# Patient Record
Sex: FEMALE | Race: WHITE | Employment: UNEMPLOYED | URBAN - NONMETROPOLITAN AREA
[De-identification: names, ages, dates, MRNs, and addresses within clinical notes are randomized per-mention and may not be internally consistent; named-entity substitution may affect disease eponyms.]

---

## 2022-06-10 ENCOUNTER — HOSPITAL ENCOUNTER (INPATIENT)
Age: 67
LOS: 3 days | Discharge: HOME HEALTH | DRG: 603 | End: 2022-06-13
Attending: EMERGENCY MEDICINE | Admitting: INTERNAL MEDICINE
Payer: OTHER GOVERNMENT

## 2022-06-10 DIAGNOSIS — R79.1 SUBTHERAPEUTIC INTERNATIONAL NORMALIZED RATIO (INR): ICD-10-CM

## 2022-06-10 DIAGNOSIS — N18.32 CHRONIC RENAL IMPAIRMENT, STAGE 3B (HCC): ICD-10-CM

## 2022-06-10 DIAGNOSIS — E11.69 TYPE 2 DIABETES MELLITUS WITH HYPERLIPIDEMIA (HCC): ICD-10-CM

## 2022-06-10 DIAGNOSIS — I89.0 LYMPHEDEMA OF BOTH LOWER EXTREMITIES: ICD-10-CM

## 2022-06-10 DIAGNOSIS — L03.115 BILATERAL LOWER LEG CELLULITIS: Primary | ICD-10-CM

## 2022-06-10 DIAGNOSIS — E66.01 MORBID OBESITY (HCC): ICD-10-CM

## 2022-06-10 DIAGNOSIS — L03.116 BILATERAL LOWER LEG CELLULITIS: Primary | ICD-10-CM

## 2022-06-10 DIAGNOSIS — L03.115 CELLULITIS OF RIGHT LEG: ICD-10-CM

## 2022-06-10 DIAGNOSIS — Z86.711 HISTORY OF PULMONARY EMBOLUS (PE): ICD-10-CM

## 2022-06-10 DIAGNOSIS — E78.5 TYPE 2 DIABETES MELLITUS WITH HYPERLIPIDEMIA (HCC): ICD-10-CM

## 2022-06-10 PROBLEM — I82.409 DVT (DEEP VENOUS THROMBOSIS) (HCC): Status: ACTIVE | Noted: 2021-06-21

## 2022-06-10 PROBLEM — E11.22 HYPERTENSION ASSOCIATED WITH STAGE 3B CHRONIC KIDNEY DISEASE DUE TO TYPE 2 DIABETES MELLITUS (HCC): Status: ACTIVE | Noted: 2019-03-12

## 2022-06-10 PROBLEM — I25.10 CORONARY ARTERY DISEASE INVOLVING NATIVE CORONARY ARTERY OF NATIVE HEART WITHOUT ANGINA PECTORIS: Status: ACTIVE | Noted: 2022-01-07

## 2022-06-10 PROBLEM — I26.99 PULMONARY EMBOLISM (HCC): Status: ACTIVE | Noted: 2021-07-08

## 2022-06-10 PROBLEM — I12.9 HYPERTENSION ASSOCIATED WITH STAGE 3B CHRONIC KIDNEY DISEASE DUE TO TYPE 2 DIABETES MELLITUS (HCC): Status: ACTIVE | Noted: 2019-03-12

## 2022-06-10 PROBLEM — R06.09 DYSPNEA ON EXERTION: Status: ACTIVE | Noted: 2021-12-27

## 2022-06-10 PROBLEM — C43.61 MALIGNANT MELANOMA OF RIGHT SHOULDER (HCC): Status: ACTIVE | Noted: 2018-09-18

## 2022-06-10 PROBLEM — K21.9 GASTRO-ESOPHAGEAL REFLUX DISEASE WITHOUT ESOPHAGITIS: Status: ACTIVE | Noted: 2018-05-10

## 2022-06-10 PROBLEM — J96.10 CHRONIC RESPIRATORY FAILURE (HCC): Status: ACTIVE | Noted: 2022-01-11

## 2022-06-10 LAB
ALBUMIN SERPL-MCNC: 3.5 G/DL (ref 3.5–5.2)
ALP BLD-CCNC: 114 U/L (ref 35–104)
ALT SERPL-CCNC: 14 U/L (ref 5–33)
ANION GAP SERPL CALCULATED.3IONS-SCNC: 10 MMOL/L (ref 7–19)
APTT: 44.6 SEC (ref 26–36.2)
APTT: 55.7 SEC (ref 26–36.2)
AST SERPL-CCNC: 21 U/L (ref 5–32)
BASOPHILS ABSOLUTE: 0 K/UL (ref 0–0.2)
BASOPHILS RELATIVE PERCENT: 0.4 % (ref 0–1)
BILIRUB SERPL-MCNC: <0.2 MG/DL (ref 0.2–1.2)
BUN BLDV-MCNC: 29 MG/DL (ref 8–23)
CALCIUM SERPL-MCNC: 8.9 MG/DL (ref 8.8–10.2)
CHLORIDE BLD-SCNC: 102 MMOL/L (ref 98–111)
CO2: 27 MMOL/L (ref 22–29)
CREAT SERPL-MCNC: 1.5 MG/DL (ref 0.5–0.9)
EKG P AXIS: 84 DEGREES
EKG P-R INTERVAL: 172 MS
EKG Q-T INTERVAL: 408 MS
EKG QRS DURATION: 86 MS
EKG QTC CALCULATION (BAZETT): 413 MS
EKG T AXIS: 67 DEGREES
EOSINOPHILS ABSOLUTE: 0.3 K/UL (ref 0–0.6)
EOSINOPHILS RELATIVE PERCENT: 2.4 % (ref 0–5)
GFR AFRICAN AMERICAN: 42
GFR NON-AFRICAN AMERICAN: 35
GLUCOSE BLD-MCNC: 141 MG/DL (ref 70–99)
GLUCOSE BLD-MCNC: 143 MG/DL (ref 70–99)
GLUCOSE BLD-MCNC: 150 MG/DL (ref 70–99)
GLUCOSE BLD-MCNC: 82 MG/DL (ref 74–109)
HBA1C MFR BLD: 8.7 % (ref 4–6)
HBA1C MFR BLD: 8.7 % (ref 4–6)
HCT VFR BLD CALC: 47.2 % (ref 37–47)
HCT VFR BLD CALC: 48 % (ref 37–47)
HEMOGLOBIN: 13.8 G/DL (ref 12–16)
HEMOGLOBIN: 14.1 G/DL (ref 12–16)
IMMATURE GRANULOCYTES #: 0.1 K/UL
INR BLD: 1.37 (ref 0.88–1.18)
LACTIC ACID: 1.6 MMOL/L (ref 0.5–1.9)
LYMPHOCYTES ABSOLUTE: 1.2 K/UL (ref 1.1–4.5)
LYMPHOCYTES RELATIVE PERCENT: 10.4 % (ref 20–40)
MCH RBC QN AUTO: 26.8 PG (ref 27–31)
MCH RBC QN AUTO: 27 PG (ref 27–31)
MCHC RBC AUTO-ENTMCNC: 29.2 G/DL (ref 33–37)
MCHC RBC AUTO-ENTMCNC: 29.4 G/DL (ref 33–37)
MCV RBC AUTO: 91.7 FL (ref 81–99)
MCV RBC AUTO: 91.8 FL (ref 81–99)
MONOCYTES ABSOLUTE: 0.8 K/UL (ref 0–0.9)
MONOCYTES RELATIVE PERCENT: 7.5 % (ref 0–10)
NEUTROPHILS ABSOLUTE: 8.8 K/UL (ref 1.5–7.5)
NEUTROPHILS RELATIVE PERCENT: 78.8 % (ref 50–65)
PDW BLD-RTO: 15.5 % (ref 11.5–14.5)
PDW BLD-RTO: 15.5 % (ref 11.5–14.5)
PERFORMED ON: ABNORMAL
PLATELET # BLD: 345 K/UL (ref 130–400)
PLATELET # BLD: 355 K/UL (ref 130–400)
PMV BLD AUTO: 10 FL (ref 9.4–12.3)
PMV BLD AUTO: 9.5 FL (ref 9.4–12.3)
POTASSIUM REFLEX MAGNESIUM: 4.7 MMOL/L (ref 3.5–5)
PROTHROMBIN TIME: 17 SEC (ref 12–14.6)
RBC # BLD: 5.15 M/UL (ref 4.2–5.4)
RBC # BLD: 5.23 M/UL (ref 4.2–5.4)
SARS-COV-2, NAAT: NOT DETECTED
SODIUM BLD-SCNC: 139 MMOL/L (ref 136–145)
TOTAL PROTEIN: 7.6 G/DL (ref 6.6–8.7)
WBC # BLD: 11.1 K/UL (ref 4.8–10.8)
WBC # BLD: 9.5 K/UL (ref 4.8–10.8)

## 2022-06-10 PROCEDURE — 85027 COMPLETE CBC AUTOMATED: CPT

## 2022-06-10 PROCEDURE — 96365 THER/PROPH/DIAG IV INF INIT: CPT

## 2022-06-10 PROCEDURE — 80053 COMPREHEN METABOLIC PANEL: CPT

## 2022-06-10 PROCEDURE — 96375 TX/PRO/DX INJ NEW DRUG ADDON: CPT

## 2022-06-10 PROCEDURE — 85025 COMPLETE CBC W/AUTO DIFF WBC: CPT

## 2022-06-10 PROCEDURE — 83036 HEMOGLOBIN GLYCOSYLATED A1C: CPT

## 2022-06-10 PROCEDURE — 6360000002 HC RX W HCPCS: Performed by: NURSE PRACTITIONER

## 2022-06-10 PROCEDURE — 96376 TX/PRO/DX INJ SAME DRUG ADON: CPT

## 2022-06-10 PROCEDURE — 2580000003 HC RX 258: Performed by: EMERGENCY MEDICINE

## 2022-06-10 PROCEDURE — 85610 PROTHROMBIN TIME: CPT

## 2022-06-10 PROCEDURE — 82947 ASSAY GLUCOSE BLOOD QUANT: CPT

## 2022-06-10 PROCEDURE — 99285 EMERGENCY DEPT VISIT HI MDM: CPT

## 2022-06-10 PROCEDURE — 87635 SARS-COV-2 COVID-19 AMP PRB: CPT

## 2022-06-10 PROCEDURE — 83605 ASSAY OF LACTIC ACID: CPT

## 2022-06-10 PROCEDURE — 6370000000 HC RX 637 (ALT 250 FOR IP): Performed by: INTERNAL MEDICINE

## 2022-06-10 PROCEDURE — 1210000000 HC MED SURG R&B

## 2022-06-10 PROCEDURE — 87040 BLOOD CULTURE FOR BACTERIA: CPT

## 2022-06-10 PROCEDURE — 6370000000 HC RX 637 (ALT 250 FOR IP): Performed by: NURSE PRACTITIONER

## 2022-06-10 PROCEDURE — 6360000002 HC RX W HCPCS: Performed by: EMERGENCY MEDICINE

## 2022-06-10 PROCEDURE — 85730 THROMBOPLASTIN TIME PARTIAL: CPT

## 2022-06-10 PROCEDURE — 36415 COLL VENOUS BLD VENIPUNCTURE: CPT

## 2022-06-10 PROCEDURE — 93005 ELECTROCARDIOGRAM TRACING: CPT | Performed by: EMERGENCY MEDICINE

## 2022-06-10 RX ORDER — HEPARIN SODIUM 10000 [USP'U]/100ML
5-30 INJECTION, SOLUTION INTRAVENOUS CONTINUOUS
Status: DISCONTINUED | OUTPATIENT
Start: 2022-06-10 | End: 2022-06-10

## 2022-06-10 RX ORDER — INSULIN GLARGINE 300 U/ML
90 INJECTION, SOLUTION SUBCUTANEOUS DAILY
COMMUNITY
Start: 2022-03-21

## 2022-06-10 RX ORDER — WARFARIN SODIUM 5 MG/1
5 TABLET ORAL SEE ADMIN INSTRUCTIONS
COMMUNITY
Start: 2021-06-22

## 2022-06-10 RX ORDER — AMLODIPINE BESYLATE 5 MG/1
5 TABLET ORAL DAILY
Status: DISCONTINUED | OUTPATIENT
Start: 2022-06-10 | End: 2022-06-13 | Stop reason: HOSPADM

## 2022-06-10 RX ORDER — INSULIN ASPART 100 [IU]/ML
INJECTION, SOLUTION INTRAVENOUS; SUBCUTANEOUS
COMMUNITY
Start: 2022-02-28

## 2022-06-10 RX ORDER — ACETAMINOPHEN 650 MG/1
650 SUPPOSITORY RECTAL EVERY 6 HOURS PRN
Status: DISCONTINUED | OUTPATIENT
Start: 2022-06-10 | End: 2022-06-13 | Stop reason: HOSPADM

## 2022-06-10 RX ORDER — POLYETHYLENE GLYCOL 3350 17 G/17G
17 POWDER, FOR SOLUTION ORAL DAILY PRN
Status: DISCONTINUED | OUTPATIENT
Start: 2022-06-10 | End: 2022-06-13 | Stop reason: HOSPADM

## 2022-06-10 RX ORDER — CEPHALEXIN 500 MG/1
CAPSULE ORAL
Status: ON HOLD | COMMUNITY
Start: 2022-04-22 | End: 2022-06-10

## 2022-06-10 RX ORDER — ATORVASTATIN CALCIUM 40 MG/1
40 TABLET, FILM COATED ORAL NIGHTLY
COMMUNITY
Start: 2021-06-20

## 2022-06-10 RX ORDER — DEXTROSE MONOHYDRATE 50 MG/ML
100 INJECTION, SOLUTION INTRAVENOUS PRN
Status: DISCONTINUED | OUTPATIENT
Start: 2022-06-10 | End: 2022-06-13 | Stop reason: HOSPADM

## 2022-06-10 RX ORDER — HEPARIN SODIUM 10000 [USP'U]/100ML
5-30 INJECTION, SOLUTION INTRAVENOUS CONTINUOUS
Status: DISCONTINUED | OUTPATIENT
Start: 2022-06-10 | End: 2022-06-13 | Stop reason: HOSPADM

## 2022-06-10 RX ORDER — ACETAMINOPHEN 325 MG/1
650 TABLET ORAL EVERY 6 HOURS PRN
Status: DISCONTINUED | OUTPATIENT
Start: 2022-06-10 | End: 2022-06-13 | Stop reason: HOSPADM

## 2022-06-10 RX ORDER — IMIQUIMOD 12.5 MG/.25G
CREAM TOPICAL
Status: ON HOLD | COMMUNITY
Start: 2021-09-15 | End: 2022-06-13 | Stop reason: HOSPADM

## 2022-06-10 RX ORDER — METOPROLOL SUCCINATE 25 MG/1
25 TABLET, EXTENDED RELEASE ORAL DAILY
Status: DISCONTINUED | OUTPATIENT
Start: 2022-06-10 | End: 2022-06-13 | Stop reason: HOSPADM

## 2022-06-10 RX ORDER — HYDROCODONE BITARTRATE AND ACETAMINOPHEN 5; 325 MG/1; MG/1
1 TABLET ORAL EVERY 6 HOURS PRN
Status: DISCONTINUED | OUTPATIENT
Start: 2022-06-10 | End: 2022-06-10

## 2022-06-10 RX ORDER — SODIUM CHLORIDE 9 MG/ML
INJECTION, SOLUTION INTRAVENOUS PRN
Status: DISCONTINUED | OUTPATIENT
Start: 2022-06-10 | End: 2022-06-13 | Stop reason: HOSPADM

## 2022-06-10 RX ORDER — HEPARIN SODIUM 1000 [USP'U]/ML
10000 INJECTION, SOLUTION INTRAVENOUS; SUBCUTANEOUS PRN
Status: DISCONTINUED | OUTPATIENT
Start: 2022-06-10 | End: 2022-06-13 | Stop reason: HOSPADM

## 2022-06-10 RX ORDER — LISINOPRIL 10 MG/1
10 TABLET ORAL DAILY
COMMUNITY

## 2022-06-10 RX ORDER — INSULIN LISPRO 100 [IU]/ML
0-6 INJECTION, SOLUTION INTRAVENOUS; SUBCUTANEOUS NIGHTLY
Status: DISCONTINUED | OUTPATIENT
Start: 2022-06-10 | End: 2022-06-13 | Stop reason: HOSPADM

## 2022-06-10 RX ORDER — ONDANSETRON 2 MG/ML
4 INJECTION INTRAMUSCULAR; INTRAVENOUS EVERY 6 HOURS PRN
Status: DISCONTINUED | OUTPATIENT
Start: 2022-06-10 | End: 2022-06-13 | Stop reason: HOSPADM

## 2022-06-10 RX ORDER — INSULIN GLARGINE 100 [IU]/ML
0.15 INJECTION, SOLUTION SUBCUTANEOUS NIGHTLY
Status: DISCONTINUED | OUTPATIENT
Start: 2022-06-10 | End: 2022-06-13 | Stop reason: HOSPADM

## 2022-06-10 RX ORDER — HEPARIN SODIUM 1000 [USP'U]/ML
5000 INJECTION, SOLUTION INTRAVENOUS; SUBCUTANEOUS PRN
Status: DISCONTINUED | OUTPATIENT
Start: 2022-06-10 | End: 2022-06-10

## 2022-06-10 RX ORDER — HYDROCODONE BITARTRATE AND ACETAMINOPHEN 5; 325 MG/1; MG/1
1-2 TABLET ORAL EVERY 6 HOURS PRN
Status: ON HOLD | COMMUNITY
Start: 2022-05-13 | End: 2022-06-10

## 2022-06-10 RX ORDER — PRAMIPEXOLE DIHYDROCHLORIDE 1 MG/1
1 TABLET ORAL EVERY EVENING
Status: DISCONTINUED | OUTPATIENT
Start: 2022-06-10 | End: 2022-06-13 | Stop reason: HOSPADM

## 2022-06-10 RX ORDER — ENOXAPARIN SODIUM 100 MG/ML
1 INJECTION SUBCUTANEOUS 2 TIMES DAILY
Status: DISCONTINUED | OUTPATIENT
Start: 2022-06-10 | End: 2022-06-10 | Stop reason: ALTCHOICE

## 2022-06-10 RX ORDER — ONDANSETRON 2 MG/ML
4 INJECTION INTRAMUSCULAR; INTRAVENOUS EVERY 30 MIN PRN
Status: DISCONTINUED | OUTPATIENT
Start: 2022-06-10 | End: 2022-06-11 | Stop reason: ALTCHOICE

## 2022-06-10 RX ORDER — ONDANSETRON 4 MG/1
4 TABLET, ORALLY DISINTEGRATING ORAL EVERY 8 HOURS PRN
Status: DISCONTINUED | OUTPATIENT
Start: 2022-06-10 | End: 2022-06-13 | Stop reason: HOSPADM

## 2022-06-10 RX ORDER — HYDROMORPHONE HYDROCHLORIDE 1 MG/ML
0.5 INJECTION, SOLUTION INTRAMUSCULAR; INTRAVENOUS; SUBCUTANEOUS
Status: COMPLETED | OUTPATIENT
Start: 2022-06-10 | End: 2022-06-10

## 2022-06-10 RX ORDER — ATORVASTATIN CALCIUM 40 MG/1
40 TABLET, FILM COATED ORAL NIGHTLY
Status: DISCONTINUED | OUTPATIENT
Start: 2022-06-11 | End: 2022-06-13 | Stop reason: HOSPADM

## 2022-06-10 RX ORDER — FUROSEMIDE 20 MG/1
20 TABLET ORAL DAILY
Status: DISCONTINUED | OUTPATIENT
Start: 2022-06-10 | End: 2022-06-12

## 2022-06-10 RX ORDER — HYDROCODONE BITARTRATE AND ACETAMINOPHEN 5; 325 MG/1; MG/1
1 TABLET ORAL EVERY 4 HOURS PRN
Status: DISCONTINUED | OUTPATIENT
Start: 2022-06-10 | End: 2022-06-13 | Stop reason: HOSPADM

## 2022-06-10 RX ORDER — OXYBUTYNIN CHLORIDE 10 MG/1
10 TABLET, EXTENDED RELEASE ORAL DAILY
COMMUNITY
Start: 2021-08-31

## 2022-06-10 RX ORDER — ALLOPURINOL 100 MG/1
150 TABLET ORAL NIGHTLY
Status: DISCONTINUED | OUTPATIENT
Start: 2022-06-10 | End: 2022-06-13 | Stop reason: HOSPADM

## 2022-06-10 RX ORDER — INSULIN LISPRO 100 [IU]/ML
0-12 INJECTION, SOLUTION INTRAVENOUS; SUBCUTANEOUS
Status: DISCONTINUED | OUTPATIENT
Start: 2022-06-10 | End: 2022-06-13 | Stop reason: HOSPADM

## 2022-06-10 RX ORDER — SODIUM CHLORIDE 0.9 % (FLUSH) 0.9 %
5-40 SYRINGE (ML) INJECTION EVERY 12 HOURS SCHEDULED
Status: DISCONTINUED | OUTPATIENT
Start: 2022-06-10 | End: 2022-06-13 | Stop reason: HOSPADM

## 2022-06-10 RX ORDER — HEPARIN SODIUM 1000 [USP'U]/ML
5000 INJECTION, SOLUTION INTRAVENOUS; SUBCUTANEOUS PRN
Status: DISCONTINUED | OUTPATIENT
Start: 2022-06-10 | End: 2022-06-13 | Stop reason: HOSPADM

## 2022-06-10 RX ORDER — ENOXAPARIN SODIUM 100 MG/ML
INJECTION SUBCUTANEOUS
Status: ON HOLD | COMMUNITY
Start: 2021-06-19 | End: 2022-06-10

## 2022-06-10 RX ORDER — SODIUM CHLORIDE 0.9 % (FLUSH) 0.9 %
5-40 SYRINGE (ML) INJECTION PRN
Status: DISCONTINUED | OUTPATIENT
Start: 2022-06-10 | End: 2022-06-13 | Stop reason: HOSPADM

## 2022-06-10 RX ORDER — PRAMIPEXOLE DIHYDROCHLORIDE 1 MG/1
1 TABLET ORAL 2 TIMES DAILY
COMMUNITY
Start: 2022-02-26

## 2022-06-10 RX ORDER — FUROSEMIDE 20 MG/1
20 TABLET ORAL 2 TIMES DAILY
COMMUNITY
Start: 2021-11-16

## 2022-06-10 RX ORDER — INSULIN LISPRO 100 [IU]/ML
0.05 INJECTION, SOLUTION INTRAVENOUS; SUBCUTANEOUS
Status: DISCONTINUED | OUTPATIENT
Start: 2022-06-10 | End: 2022-06-13 | Stop reason: HOSPADM

## 2022-06-10 RX ORDER — WARFARIN SODIUM 7.5 MG/1
7.5 TABLET ORAL SEE ADMIN INSTRUCTIONS
COMMUNITY

## 2022-06-10 RX ORDER — SULFAMETHOXAZOLE AND TRIMETHOPRIM 800; 160 MG/1; MG/1
TABLET ORAL
Status: ON HOLD | COMMUNITY
Start: 2022-04-22 | End: 2022-06-10

## 2022-06-10 RX ORDER — ALLOPURINOL 100 MG/1
150 TABLET ORAL DAILY
Status: DISCONTINUED | OUTPATIENT
Start: 2022-06-10 | End: 2022-06-10

## 2022-06-10 RX ORDER — ATORVASTATIN CALCIUM 40 MG/1
40 TABLET, FILM COATED ORAL DAILY
Status: DISCONTINUED | OUTPATIENT
Start: 2022-06-10 | End: 2022-06-10

## 2022-06-10 RX ORDER — ENOXAPARIN SODIUM 100 MG/ML
160 INJECTION SUBCUTANEOUS ONCE
Status: COMPLETED | OUTPATIENT
Start: 2022-06-10 | End: 2022-06-10

## 2022-06-10 RX ORDER — ALLOPURINOL 300 MG/1
150 TABLET ORAL NIGHTLY
COMMUNITY

## 2022-06-10 RX ORDER — OMEPRAZOLE 40 MG/1
40 CAPSULE, DELAYED RELEASE ORAL 2 TIMES DAILY
COMMUNITY
Start: 2022-01-11

## 2022-06-10 RX ORDER — AMLODIPINE BESYLATE 5 MG/1
5 TABLET ORAL DAILY
COMMUNITY
Start: 2021-06-20

## 2022-06-10 RX ORDER — ENOXAPARIN SODIUM 100 MG/ML
1 INJECTION SUBCUTANEOUS ONCE
Status: DISCONTINUED | OUTPATIENT
Start: 2022-06-10 | End: 2022-06-10

## 2022-06-10 RX ORDER — PREDNISOLONE ACETATE 10 MG/ML
SUSPENSION/ DROPS OPHTHALMIC
Status: ON HOLD | COMMUNITY
Start: 2021-11-23 | End: 2022-06-10

## 2022-06-10 RX ORDER — CLINDAMYCIN PHOSPHATE 10 UG/ML
LOTION TOPICAL
Status: ON HOLD | COMMUNITY
Start: 2021-08-25 | End: 2022-06-13 | Stop reason: HOSPADM

## 2022-06-10 RX ORDER — LISINOPRIL 10 MG/1
10 TABLET ORAL DAILY
Status: DISCONTINUED | OUTPATIENT
Start: 2022-06-10 | End: 2022-06-13 | Stop reason: HOSPADM

## 2022-06-10 RX ORDER — OXYBUTYNIN CHLORIDE 5 MG/1
10 TABLET, EXTENDED RELEASE ORAL NIGHTLY
Status: DISCONTINUED | OUTPATIENT
Start: 2022-06-10 | End: 2022-06-13 | Stop reason: HOSPADM

## 2022-06-10 RX ORDER — PANTOPRAZOLE SODIUM 40 MG/1
40 TABLET, DELAYED RELEASE ORAL
Status: DISCONTINUED | OUTPATIENT
Start: 2022-06-11 | End: 2022-06-11

## 2022-06-10 RX ORDER — METOPROLOL SUCCINATE 25 MG/1
25 TABLET, EXTENDED RELEASE ORAL DAILY
COMMUNITY
Start: 2021-06-20

## 2022-06-10 RX ORDER — MORPHINE SULFATE 2 MG/ML
2 INJECTION, SOLUTION INTRAMUSCULAR; INTRAVENOUS EVERY 4 HOURS PRN
Status: DISCONTINUED | OUTPATIENT
Start: 2022-06-10 | End: 2022-06-12

## 2022-06-10 RX ADMIN — ENOXAPARIN SODIUM 160 MG: 100 INJECTION SUBCUTANEOUS at 10:21

## 2022-06-10 RX ADMIN — HEPARIN SODIUM AND DEXTROSE 18 UNITS/KG/HR: 10000; 5 INJECTION INTRAVENOUS at 23:05

## 2022-06-10 RX ADMIN — ONDANSETRON HYDROCHLORIDE 4 MG: 2 SOLUTION INTRAMUSCULAR; INTRAVENOUS at 08:11

## 2022-06-10 RX ADMIN — MORPHINE SULFATE 2 MG: 2 INJECTION, SOLUTION INTRAMUSCULAR; INTRAVENOUS at 18:43

## 2022-06-10 RX ADMIN — VANCOMYCIN HYDROCHLORIDE 1500 MG: 1 INJECTION, POWDER, LYOPHILIZED, FOR SOLUTION INTRAVENOUS at 10:06

## 2022-06-10 RX ADMIN — ALLOPURINOL 150 MG: 100 TABLET ORAL at 23:01

## 2022-06-10 RX ADMIN — MORPHINE SULFATE 2 MG: 2 INJECTION, SOLUTION INTRAMUSCULAR; INTRAVENOUS at 13:25

## 2022-06-10 RX ADMIN — WARFARIN SODIUM 7.5 MG: 5 TABLET ORAL at 18:43

## 2022-06-10 RX ADMIN — MORPHINE SULFATE 2 MG: 2 INJECTION, SOLUTION INTRAMUSCULAR; INTRAVENOUS at 23:29

## 2022-06-10 RX ADMIN — INSULIN LISPRO 8 UNITS: 100 INJECTION, SOLUTION INTRAVENOUS; SUBCUTANEOUS at 13:30

## 2022-06-10 RX ADMIN — Medication 1000 MG: at 09:17

## 2022-06-10 RX ADMIN — PRAMIPEXOLE DIHYDROCHLORIDE 1 MG: 1 TABLET ORAL at 18:43

## 2022-06-10 RX ADMIN — HYDROCODONE BITARTRATE AND ACETAMINOPHEN 1 TABLET: 5; 325 TABLET ORAL at 12:10

## 2022-06-10 RX ADMIN — INSULIN GLARGINE 24 UNITS: 100 INJECTION, SOLUTION SUBCUTANEOUS at 23:01

## 2022-06-10 RX ADMIN — INSULIN LISPRO 8 UNITS: 100 INJECTION, SOLUTION INTRAVENOUS; SUBCUTANEOUS at 18:50

## 2022-06-10 RX ADMIN — CEFEPIME 2000 MG: 2 INJECTION, POWDER, FOR SOLUTION INTRAMUSCULAR; INTRAVENOUS at 09:17

## 2022-06-10 RX ADMIN — INSULIN LISPRO 2 UNITS: 100 INJECTION, SOLUTION INTRAVENOUS; SUBCUTANEOUS at 23:02

## 2022-06-10 RX ADMIN — HYDROMORPHONE HYDROCHLORIDE 0.5 MG: 1 INJECTION, SOLUTION INTRAMUSCULAR; INTRAVENOUS; SUBCUTANEOUS at 08:12

## 2022-06-10 RX ADMIN — HYDROMORPHONE HYDROCHLORIDE 0.5 MG: 1 INJECTION, SOLUTION INTRAMUSCULAR; INTRAVENOUS; SUBCUTANEOUS at 09:16

## 2022-06-10 ASSESSMENT — ENCOUNTER SYMPTOMS
CHOKING: 0
COLOR CHANGE: 1
ABDOMINAL PAIN: 0
CONSTIPATION: 0
SORE THROAT: 0
EYE DISCHARGE: 0
FACIAL SWELLING: 0
VOMITING: 0
BLOOD IN STOOL: 0
NAUSEA: 0
WHEEZING: 0
VOICE CHANGE: 0
CHEST TIGHTNESS: 0
APNEA: 0
DIARRHEA: 0
SINUS PRESSURE: 0
SHORTNESS OF BREATH: 0

## 2022-06-10 ASSESSMENT — PAIN SCALES - GENERAL
PAINLEVEL_OUTOF10: 9
PAINLEVEL_OUTOF10: 7
PAINLEVEL_OUTOF10: 8
PAINLEVEL_OUTOF10: 8

## 2022-06-10 ASSESSMENT — PAIN DESCRIPTION - LOCATION
LOCATION: LEG

## 2022-06-10 ASSESSMENT — PAIN DESCRIPTION - ORIENTATION
ORIENTATION: RIGHT
ORIENTATION: RIGHT

## 2022-06-10 ASSESSMENT — PAIN DESCRIPTION - DESCRIPTORS
DESCRIPTORS: ACHING;BURNING
DESCRIPTORS: DISCOMFORT;NAGGING
DESCRIPTORS: BURNING;ACHING

## 2022-06-10 NOTE — PROGRESS NOTES
Physical Therapy  Observed Pt up ad xochilt in her room and noted to have a mildly antalgic gt but she states she feels like she can amb on her own. Reports she would need a rw if she were to amb distance outside of her room.   Electronically signed by Edmond Bañuelos PT on 6/10/2022 at 1:38 PM

## 2022-06-10 NOTE — ED PROVIDER NOTES
[facility] EMERGENCY DEPT  eMERGENCY dEPARTMENT eNCOUnter      Pt Name: Erica Lacy  MRN: [id number]  [place] 1955  Date of evaluation: 6/10/2022  Provider: Erica Hughes MD    [address] [place] [address]       Chief Complaint   Patient presents with    Leg Swelling         HISTORY OF PRESENT ILLNESS   (Location/Symptom, Timing/Onset,Context/Setting, Quality, Duration, Modifying Factors, Severity)  Note limiting factors. Erica Lacy is a 61 y.o. female who presents to the emergency department for right leg swelling    81-year-old female traveling from [place] back home to [place]. She is been on the road since Monday traveling. She has lower extremity stasis and swelling. She has had Unna boots on this is an ongoing thing. But her right leg is gotten very red and painful. And of course driving makes it worse because her legs are down and cannot be elevated. So she is having a struggle getting home. She called her insurance company because of her symptoms and advised that she come to be evaluated. She is having chills but no documented fever. She is been on multiple rounds of doxycycline. She had a rash as well for about 3 weeks its pretty well generalized she is still getting a few new spots. She is a diabetic. This is her first visit at our institution hopefully we can chart merge and found out more of her past medical history. She was seen in the emergency department in [place] she has dressings on her lower extremities now and she was given some codeine tablets which she is. She thinks her allergic skin rash may be secondary to calamine or zinc apparently she had Unna boots placed when the rash started. She is COVID vaccinated    The history is provided by the patient. NursingNotes were reviewed. REVIEW OF SYSTEMS    (2-9 systems for level 4, 10 or more for level 5)     Review of Systems   Constitutional: Positive for chills.    HENT: Negative for congestion, drooling, facial swelling, nosebleeds, sinus pressure, sore throat and voice change. Eyes: Negative for discharge. Respiratory: Negative for apnea, choking and shortness of breath. Cardiovascular: Negative for chest pain and leg swelling. Gastrointestinal: Negative for abdominal pain, blood in stool, constipation, diarrhea and nausea. Genitourinary: Negative for dysuria and enuresis. Musculoskeletal: Negative for joint swelling. Skin: Positive for color change, rash and wound. Neurological: Negative for seizures and syncope. Psychiatric/Behavioral: Positive for sleep disturbance. Negative for behavioral problems, hallucinations and suicidal ideas. All other systems reviewed and are negative. A complete review of systems was performed and is negative except as noted above in the HPI. PAST MEDICAL HISTORY   No past medical history on file. SURGICAL HISTORY     No past surgical history on file. CURRENT MEDICATIONS       Previous Medications    ALLOPURINOL (ZYLOPRIM) 300 MG TABLET    Take 150 mg by mouth daily    AMLODIPINE (NORVASC) 5 MG TABLET        ATORVASTATIN (LIPITOR) 40 MG TABLET        CEPHALEXIN (KEFLEX) 500 MG CAPSULE        CLINDAMYCIN (CLEOCIN T) 1 % LOTION        ENOXAPARIN (LOVENOX) 100 MG/ML        FUROSEMIDE (LASIX) 20 MG TABLET        HYDROCODONE-ACETAMINOPHEN (NORCO) 5-325 MG PER TABLET    Take 1-2 tablets by mouth every 6 hours as needed.     IMIQUIMOD (ALDARA) 5 % CREAM        INSULIN ASPART (NOVOLOG) 100 UNIT/ML INJECTION VIAL        INSULIN GLARGINE, 2 UNIT DIAL, (TOUJEO MAX SOLOSTAR) 300 UNIT/ML SOPN        LISINOPRIL (PRINIVIL;ZESTRIL) 10 MG TABLET    lisinopril 10 mg tablet   TAKE 1 TABLET BY MOUTH ONCE DAILY FOR 30 DAYS    METOPROLOL SUCCINATE (TOPROL XL) 25 MG EXTENDED RELEASE TABLET        OMEPRAZOLE (PRILOSEC) 40 MG DELAYED RELEASE CAPSULE        OXYBUTYNIN (DITROPAN-XL) 10 MG EXTENDED RELEASE TABLET        PRAMIPEXOLE (MIRAPEX) 1 MG TABLET        PREDNISOLONE ACETATE (PRED FORTE) 1 % OPHTHALMIC SUSPENSION        SULFAMETHOXAZOLE-TRIMETHOPRIM (BACTRIM DS;SEPTRA DS) 800-160 MG PER TABLET        WARFARIN (COUMADIN) 5 MG TABLET           ALLERGIES     Patient has no known allergies. FAMILY HISTORY     No family history on file. SOCIAL HISTORY       Social History     Socioeconomic History    Marital status:      Spouse name: Not on file    Number of children: Not on file    Years of education: Not on file    Highest education level: Not on file   Occupational History    Not on file   Tobacco Use    Smoking status: Not on file    Smokeless tobacco: Not on file   Substance and Sexual Activity    Alcohol use: Not on file    Drug use: Not on file    Sexual activity: Not on file   Other Topics Concern    Not on file   Social History Narrative    Not on file     Social Determinants of Health     Financial Resource Strain:     Difficulty of Paying Living Expenses: Not on file   Food Insecurity:     Worried About Running Out of Food in the Last Year: Not on file    Johanny of Food in the Last Year: Not on file   Transportation Needs:     Lack of Transportation (Medical): Not on file    Lack of Transportation (Non-Medical):  Not on file   Physical Activity:     Days of Exercise per Week: Not on file    Minutes of Exercise per Session: Not on file   Stress:     Feeling of Stress : Not on file   Social Connections:     Frequency of Communication with Friends and Family: Not on file    Frequency of Social Gatherings with Friends and Family: Not on file    Attends Orthodoxy Services: Not on file    Active Member of Clubs or Organizations: Not on file    Attends Club or Organization Meetings: Not on file    Marital Status: Not on file   Intimate Partner Violence:     Fear of Current or Ex-Partner: Not on file    Emotionally Abused: Not on file    Physically Abused: Not on file    Sexually Abused: Not on file   Housing Stability:     Unable to Pay for Housing in the Last Year: Not on file    Number of Places Lived in the Last Year: Not on file    Unstable Housing in the Last Year: Not on file       SCREENINGS    [place] Coma Scale  Eye Opening: Spontaneous  Best Verbal Response: Oriented  Best Motor Response: Obeys commands  [place] Coma Scale Score: 15        PHYSICAL EXAM    (up to 7 for level 4, 8 or more for level 5)     ED Triage Vitals [06/10/22 0713]   BP Temp Temp Source Heart Rate Resp SpO2 Height Weight   (!) 156/65 98.2 °F (36.8 °C) Oral 72 16 97 % -- --       Physical Exam  Vitals and nursing note reviewed. Constitutional:       Appearance: She is well-developed. She is obese. HENT:      Head: Normocephalic and atraumatic. Right Ear: External ear normal.      Left Ear: External ear normal.   Eyes:      General: No scleral icterus. Conjunctiva/sclera: Conjunctivae normal.      Pupils: Pupils are equal, round, and reactive to light. Cardiovascular:      Rate and Rhythm: Normal rate and regular rhythm. Heart sounds: Normal heart sounds. No murmur heard. Pulmonary:      Effort: Pulmonary effort is normal. No respiratory distress. Breath sounds: Normal breath sounds. Abdominal:      General: Bowel sounds are normal.      Palpations: Abdomen is soft. Musculoskeletal:         General: Normal range of motion. Cervical back: Normal range of motion and neck supple. Skin:     General: Skin is warm and dry. Coloration: Skin is not jaundiced. Findings: Rash (Looks like that papular rash is pretty diffuse and widespread.) present. Comments: The right leg the entire right lower portion is bright red and weeping. There is a margin of erythema just above the knee. The left leg has cellulitic or bright red erythema but mostly in the midportion of the extremity. Whereas on the right and includes the foot. Neurological:      General: No focal deficit present.       Mental Status: She is alert and oriented to person, place, and time. Psychiatric:         Mood and Affect: Mood normal.         Behavior: Behavior normal.         DIAGNOSTIC RESULTS     EKG: All EKG's are interpreted by the Emergency Department Physician who either signs or Co-signs this chart in the absence of a cardiologist.    Sinus rhythm rate 64. ME interval 174. QTc 433. No ST abnormalities to suggest ischemia.     RADIOLOGY:   Non-plain film images such as CT, Ultrasound and MRI are read by the radiologist. Paz Mems images are visualized and preliminarily interpreted by the emergency physician with the below findings:        Interpretation per the Radiologist below, if available at the time of this note:    No orders to display         ED BEDSIDE ULTRASOUND:   Performed by ED Physician - none    LABS:  Labs Reviewed   CBC WITH AUTO DIFFERENTIAL - Abnormal; Notable for the following components:       Result Value    WBC 11.1 (*)     Hematocrit 47.2 (*)     MCH 26.8 (*)     MCHC 29.2 (*)     RDW 15.5 (*)     Neutrophils % 78.8 (*)     Lymphocytes % 10.4 (*)     Neutrophils Absolute 8.8 (*)     All other components within normal limits   COMPREHENSIVE METABOLIC PANEL W/ REFLEX TO MG FOR LOW K - Abnormal; Notable for the following components:    BUN 29 (*)     CREATININE 1.5 (*)     GFR Non- 35 (*)     GFR African American 42 (*)     Alkaline Phosphatase 114 (*)     All other components within normal limits   HEMOGLOBIN A1C - Abnormal; Notable for the following components:    Hemoglobin A1C 8.7 (*)     All other components within normal limits   PROTIME-INR - Abnormal; Notable for the following components:    Protime 17.0 (*)     INR 1.37 (*)     All other components within normal limits   APTT - Abnormal; Notable for the following components:    aPTT 44.6 (*)     All other components within normal limits   COVID-19, RAPID   CULTURE, BLOOD 1   CULTURE, BLOOD 2   LACTIC ACID       All other labs were within normal range or not returned as of this dictation. EMERGENCY DEPARTMENT COURSE and DIFFERENTIALDIAGNOSIS/MDM:   Vitals:    Vitals:    06/10/22 0713 06/10/22 0908   BP: (!) 156/65    Pulse: 72    Resp: 16    Temp: 98.2 °F (36.8 °C)    TempSrc: Oral    SpO2: 97%    Weight:  (!) 360 lb (163.3 kg)   Height:  5' 3\" (1.6 m)       MDM  Number of Diagnoses or Management Options  Bilateral lower leg cellulitis  Chronic renal impairment, stage 3b (HCC)  History of pulmonary embolus (PE)  Lymphedema of both lower extremities  Subtherapeutic international normalized ratio (INR)  Type 2 diabetes mellitus with hyperlipidemia ([address]  Diagnosis management comments: 8:35 AM.  The patient's care everywhere did merge. There is a med list available I have reviewed. He is recently had vascular studies and CT of the extremity no obvious clot. But she is on Coumadin for history of PE. She does not recall being hospitalized in the last 90 days. Her list shows that she been treated with Bactrim which may be why she is having this allergic rash. Also with Keflex clindamycin and doxycycline. She has never had a personal history of MRSA but I am going to give her a dose of vancomycin here and start cefepime. She is not known to be allergic to any other antibiotic. Her pain has improved slightly. 9:30 AM.  I am going to discussed the case with hospitalist service for admission. Patient's INR is subtherapeutic and I am giving her a shot of Lovenox to bridge her therapy until adjustments can be made. She remained stable at this time. CONSULTS:  IP CONSULT TO PHARMACY    PROCEDURES:  Unless otherwise notedbelow, none     Procedures    FINAL IMPRESSION     1. Bilateral lower leg cellulitis    2. Chronic renal impairment, stage 3b (HCC)    3. Type 2 diabetes mellitus with hyperlipidemia ([address]    4. History of pulmonary embolus (PE)    5.  Subtherapeutic international normalized ratio (INR)    6. Lymphedema of both lower extremities 7.  Morbid obesity ([facility] [address]          DISPOSITION/PLAN   DISPOSITION        PATIENT REFERRED TO:  @FUP@    DISCHARGE MEDICATIONS:  New Prescriptions    No medications on file          (Please note that portions of this note were completed with a voice recognition program.  Efforts were made to edit the dictations butoccasionally words are mis-transcribed.)    Eryn Feng MD (electronically signed)  AttendingEmergency Physician          Katerina Newton MD  06/10/22 4469

## 2022-06-10 NOTE — PROGRESS NOTES
[facility] Pharmacokinetic Monitoring Service - Vancomycin     [place] Selena Avalos is a 61 y.o. female starting on vancomycin therapy for SSTI. Pharmacy [facility] for monitoring and adjustment. Target Concentration: Goal trough of 10-15 mg/L and AUC/LEESA <500 mg*hr/L    Additional Antimicrobials:     Pertinent Laboratory Values: Wt Readings from Last 1 Encounters:   06/10/22 (!) 360 lb (163.3 kg)     Temp Readings from Last 1 Encounters:   06/10/22 98.2 °F (36.8 °C) (Oral)     Estimated Creatinine Clearance: 58 mL/min (A) (based on SCr of 1.5 mg/dL (H)). Recent Labs     06/10/22  0815   CREATININE 1.5*   WBC 11.1*     Procalcitonin: No level    Pertinent Cultures:  Culture Date Source Results   06/10/22 Blood Sent   MRSA Nasal Swab: N/A. Non-respiratory infection.     Plan:  Dosing recommendations based on Bayesian software  Give additional vancomycin 1500mg IV x 1 dose for a total of 2500mg then 1000mg IV q 24 hours  Anticipated AUC of 453 and trough concentration of 12.4 at steady state  Renal labs as indicated   Vancomycin concentration ordered for 06/13/22 @ 1000   Pharmacy will continue to monitor patient and adjust therapy as indicated    Thank you for the consult,  RACHID HUNT, PHARM D, 6/10/2022, 9:25 AM

## 2022-06-10 NOTE — PROGRESS NOTES
Clinical Pharmacy Note    Aury Salazar is a 61 y.o. female for whom pharmacy has been asked to manage warfarin therapy. Reason for Admission: Cellulitis of right leg    Consulting Physician: Gordo Dinh  Warfarin dose prior to admission:  (Patient taking differently: Take 5 mg by mouth daily on Sunday, Tuesday and Thursday. Take 7.5 mg by mouth daily on Monday, Wednesday, Friday and Saturday.)   Warfarin indication: HX of PE  Target INR range: 2-3   Outpatient warfarin provider: Sneha Herrera. Recent Labs     06/10/22  0815   INR 1.37*     Recent Labs     06/10/22  0815   HGB 13.8   HCT 47.2*          Current warfarin drug-drug interactions: Complete med list not ordered yet        Date INR Warfarin Dose   06/10/22 1.37 7.5 mg                                     Daily PT/INR until stable within therapeutic range. Will order Warfarin 7.5 mg x 1 tonight    Thank you for the consult.      Electronically signed by Rupa Sahu, [facility] on 6/10/2022 at 10:32 AM

## 2022-06-10 NOTE — CARE COORDINATION
Patient Contact Information:    [address] [facility]  [phone] (home)   Telephone Information:   Mobile [phone]     Above information verified? [x]   Yes  []   No      Emergency Contacts:    Extended Emergency Contact Information  Primary Emergency Contact: VERONICA PATTON  Mobile Phone: [phone]  Relation: Brother/Sister      Have you been vaccinated for COVID-19 (SARS-CoV-2)? [x]   Yes  []   No                   If so, when? Which :         [x]   Pfizer-BioNTech  []   Moderna  []   Colie Legato  []   Other:         Patient Deficits:    []   Yes   [x]   No    If yes:    []   Confusion/Memory  []   Visual  []   Motor/Sensory         []   Right arm         []   Right leg         []   Left arm         []   Left leg  []   Language/Speech         []   Aphasia         []   Dysarthria         []   Swallow         [place] Coma Scale  Eye Opening: Spontaneous  Best Verbal Response: Oriented  Best Motor Response: Obeys commands  Luis Manuel Coma Scale Score: 15    Patient Deficit Notes: SW met with pt at bedside. Pt states she lives in [place] [facility] [place]. States she lives in the basement of her brother and sister in laws home. She states her brother is very helpful and provides a lot of support. Pt states she traveled to [place] [facility] to see her daughter and her friend with cancer. She states everyday the pain in her legs got worse. Pt states it got to the point where she was in so much pain she was unable to sleep. She states due to lack of sleep she was getting to the point where she could only drive about 2 hours a day while traveling home. She states she was thinking to herself last night at the hotel. Oswaldo Founds \"how will I ever get home? \" This led her to come to the ED. States she is in a rental car and has been in communication with her brother who states he will help pt get home. Pt states he may drive here. [place] is [place] 20 hours from her home.  Pt states her brother is flexible and can come any day.    Pt also stated that she sleeps in a zero gravity chair at home. She states her feet are above her heart and this controls the swelling in her legs. She states this chair is very comfortable and not having it has also messed up her sleep. Pt denied any needs at this time. Informed pt if any needs arise to please let SW know.    Electronically signed by Sofya Mclean on 6/10/2022 at 9:37 AM

## 2022-06-10 NOTE — H&P
Bryce Arriaga - History & Physical      PCP: No primary care provider on file. Date of Admission: 6/10/2022    Date of Service: 6/10/2022    Chief Complaint:  Right leg pain    History Of Present Illness: The patient is a 61 y.o. female with past medical history of CKD, lymphedema, melanoma, venous insufficiency, and PE on Coumadin therapy who presented to [address] [place] [address] ED complaining of worsening right leg pain. Patient indicates she has had lymphedema for significant amount of time. Patient reports having home health care with dressing changes 3 days a week to bilateral lower extremities. She reports in the past 3 months having 3-4 different rounds of antibiotics. Patient indicates her best friend was recently diagnosed with brain cancer and she traveled from [address] [place] [address] to [place] [address] to visit. Patient indicates while in [place] her legs become worse and she actually went to the emergency department there. Patient reports she was given some pain medication and recommended following up with her PCP. Patient reports leaving [facility] on Monday and has been unable to travel very long as she has had significant pain in her legs. Patient reports she has been unable to drive for more than 1 to 2 hours at a time and only making it approximately 3 to 4 hours a day. Patient reports this morning the redness in her right lower extremity was significantly worse and she began having a significant amount of drainage as well. Patient reports calling the nurse line for her insurance company and was given address to this facility for evaluation. Patient indicates she has also had a rash for at least the past 6 to 8 weeks. She reports the rash on her entire body and is very itchy. She denies any shortness of breath or wheezing. She reports she has not taken anything for the rash and nothing seems to make it better or worse.   ED work-up indicated creatinine 1.5 which appears to be baseline, alk phos 114, WBC 11.1, and INR 1.37. Blood cultures obtained. COVID-negative. Patient admitted to hospitalist service for right lower extremity cellulitis. Patient placed on empiric antibiotics. Past Medical History:        Diagnosis Date    Anemia     Blood circulation, collateral     Cancer (HCC)     Chronic kidney disease     GERD (gastroesophageal reflux disease)     Hyperlipidemia     Hypertension     Iron (Fe) deficiency anemia     Lymphedema     Lymphedema     Melanoma in situ of right shoulder ([address] 05/01/2012    remission    Pneumonia     Pulmonary embolism (HCC)     Restless legs syndrome     Venous insufficiency        Past Surgical History:        Procedure Laterality Date    BREAST SURGERY      COLONOSCOPY      COSMETIC SURGERY      ENDOSCOPY, COLON, DIAGNOSTIC      GASTRIC BYPASS SURGERY      HYSTERECTOMY (CERVIX STATUS UNKNOWN)      SKIN BIOPSY         Home Medications:  Prior to Admission medications    Medication Sig Start Date End Date Taking?  Authorizing Provider   amLODIPine (NORVASC) 5 MG tablet Take 5 mg by mouth daily  6/20/21  Yes Historical Provider, MD   atorvastatin (LIPITOR) 40 MG tablet Take 40 mg by mouth nightly  6/20/21  Yes Historical Provider, MD   clindamycin (CLEOCIN T) 1 % lotion  8/25/21  Yes Historical Provider, MD   furosemide (LASIX) 20 MG tablet Take 20 mg by mouth 2 times daily  11/16/21  Yes Historical Provider, MD   imiquimod Eliverto Ax) 5 % cream  9/15/21  Yes Historical Provider, MD   insulin aspart (NOVOLOG) 100 UNIT/ML injection vial Inject into the skin 3 times daily (before meals) Sliding scale with meals 2/28/22  Yes Historical Provider, MD   Insulin Glargine, 2 Unit Dial, (TOUJEO MAX SOLOSTAR) 300 UNIT/ML SOPN 90 Units daily  3/21/22  Yes Historical Provider, MD   metoprolol succinate (TOPROL XL) 25 MG extended release tablet Take 25 mg by mouth daily  6/20/21  Yes Historical Provider, MD   omeprazole (PRILOSEC) 40 MG delayed release capsule Take 40 mg by mouth in the morning and at bedtime  1/11/22  Yes Historical Provider, MD   oxybutynin (DITROPAN-XL) 10 mg extended release tablet Take 10 mg by mouth daily  8/31/21  Yes Historical Provider, MD   pramipexole (MIRAPEX) 1 MG tablet Take 1 mg by mouth in the morning and at bedtime  2/26/22  Yes Historical Provider, MD   warfarin (COUMADIN) 5 MG tablet Take 5 mg by mouth See Admin Instructions Takes on Tuesday, Thursday, Sunday 6/22/21  Yes Historical Provider, MD   warfarin (COUMADIN) 7.5 MG tablet Take 7.5 mg by mouth See Admin Instructions Takes on Monday, Wednesday, Friday, and saturday   Yes Historical Provider, MD   allopurinol (ZYLOPRIM) 300 MG tablet Take 150 mg by mouth nightly     Historical Provider, MD   lisinopril (PRINIVIL;ZESTRIL) 10 MG tablet Take 10 mg by mouth daily     Historical Provider, MD       Allergies:    Patient has no known allergies. Social History:    The patient currently lives [place], [place]  Tobacco:   reports that she quit smoking about 24 years ago. Her smoking use included cigarettes. She started smoking about 34 years ago. She has a 15.00 pack-year smoking history. She has never used smokeless tobacco.  Alcohol:   reports no history of alcohol use. Illicit Drugs: denies    Family History:  History reviewed. No pertinent family history. Review of Systems:   Review of Systems   Constitutional: Positive for activity change, chills and fatigue. Negative for appetite change and fever. Respiratory: Negative for chest tightness, shortness of breath and wheezing. Cardiovascular: Positive for leg swelling. Negative for chest pain and palpitations. Gastrointestinal: Negative for abdominal pain, constipation, diarrhea, nausea and vomiting. Genitourinary: Negative for difficulty urinating, dysuria, flank pain and frequency. Musculoskeletal: Positive for myalgias. Negative for joint swelling. Skin: Positive for color change, rash and wound.    Neurological: Negative for dizziness and headaches. Psychiatric/Behavioral: Negative for agitation and confusion. 14 point review of systems is negative except as specifically addressed above. Physical Examination:  BP (!) 179/79   Pulse 75   Temp (!) 96.3 °F (35.7 °C)   Resp 18   Ht 5' 3\" (1.6 m)   Wt (!) 359 lb 9.6 oz (163.1 kg)   SpO2 98%   BMI 63.70 kg/m²   Physical Exam  Vitals reviewed. HENT:      Head: Normocephalic. Cardiovascular:      Rate and Rhythm: Normal rate and regular rhythm. Pulses: Normal pulses. Heart sounds: Normal heart sounds. Pulmonary:      Effort: Pulmonary effort is normal.      Breath sounds: Normal breath sounds. Abdominal:      General: Bowel sounds are normal. There is no distension. Palpations: Abdomen is soft. Tenderness: There is no abdominal tenderness. There is no guarding. Musculoskeletal:         General: Normal range of motion. Right lower leg: Edema present. Left lower leg: Edema present. Skin:     Capillary Refill: Capillary refill takes less than 2 seconds. Findings: Erythema and rash present. Comments: Generalized papular rash scattered over entire body. Right lower extremity with erythema to approximately the knee. 4+ edema with weeping noted. Left lower extremity 2+ edema and slightly reddened with no drainage. Neurological:      General: No focal deficit present. Mental Status: She is alert.           Diagnostic Data:  CBC:  Recent Labs     06/10/22  0815 06/10/22  1309   WBC 11.1* 9.5   HGB 13.8 14.1   HCT 47.2* 48.0*    355     BMP:  Recent Labs     06/10/22  0815      K 4.7      CO2 27   BUN 29*   CREATININE 1.5*   CALCIUM 8.9     Recent Labs     06/10/22  0815   AST 21   ALT 14   BILITOT <0.2   ALKPHOS 114*     Coag Panel:   Recent Labs     06/10/22  0815   INR 1.37*   PROTIME 17.0*   APTT 44.6*     Cardiac Enzymes: No results for input(s): Erasmo العلي in the last 72 hours.  ABGs:No results found for: PHART, PO2ART, KQB2SUX  Urinalysis:No results found for: Dorann Bills, WBCUA, BACTERIA, RBCUA, BLOODU, SPECGRAV, GLUCOSEU  A1C:   Recent Labs     06/10/22  1130   LABA1C 8.7*     ABG:No results for input(s): PHART, FNN1PYZ, PO2ART, EZI7ZMF, BEART, HGBAE, I3RJYWWB, CARBOXHGBART in the last 72 hours. No results found. Assessment/Plan:  Principal Problem:    Cellulitis of right leg / Lymphedema of both lower extremities / Venous insufficiency of both lower extremities   -admit   -Vancomycin and cefepime    -blood cultures   -monitor CBC and BMP   -monitor for signs of worsening infection   -neurovascular checks    Active Problems:    Chronic respiratory failure (HCC)   -noted, no acute distress, not currently on home oxygen      Coronary artery disease involving native coronary artery of native heart without angina pectoris   -noted, denies chest pain      DVT (deep venous thrombosis) ([address] / Pulmonary embolism ([address]     -continue coumadin, pharmacy to dose   -heparin drip until therapeutic      Gastro-esophageal reflux disease without esophagitis   -continue home PPI      Hyperlipidemia, unspecified   -continue home statin      Hypertension associated with stage 3b chronic kidney disease due to type 2 diabetes mellitus ([address]   -monitor BP   -monitor bmp   -ovoid hypotension and nephrotoxins      Malignant melanoma of right shoulder (HCC)     -in remission      Type 2 diabetes mellitus (HCC)   -A1c   -accu checks   -basal and ssi   -hypoglycemia protocol in place    Resolved Problems:    * No resolved hospital problems.  *       Signed:  BLAKE Zavaleta - CNP, 6/10/2022 3:05 PM

## 2022-06-10 NOTE — CARE COORDINATION
06/10/22 0909   Service Assessment   Patient Orientation Alert and Oriented   Cognition Alert   History Provided By Patient   Primary Caregiver Self   Accompanied By/Relationship No one at bedside. Support Systems Family Members   Patient's Healthcare Decision Maker is:   (No one)   PCP Verified by CM Yes  (Dr. Harsh Oglesby)   Last Visit to PCP Within last 3 months   Prior Functional Level Independent in ADLs/IADLs; Bathing;Dressing; Toileting;Feeding;Cooking; Shopping;Housework; Mobility   Current Functional Level Independent in ADLs/IADLs; Bathing;Dressing; Toileting;Feeding;Cooking;Housework; Shopping;Mobility   Can patient return to prior living arrangement Yes   Ability to make needs known: Good   Family able to assist with home care needs: Yes   Would you like for me to discuss the discharge plan with any other family members/significant others, and if so, who? No   Financial Resources Other (Comment)  (jail only)   Community Resources   (None)   CM/SW Referral   (None)   Social/Functional History   Lives With Family  (Brother and sister in law)   Type of [address]   (Tri level)   Bathroom Shower/Tub Walk-in shower   Bathroom Toilet Standard   Bathroom Equipment Grab bars in [facility]  (APAP machine & machine for legs to move fluid)   Receives Help From Family   ADL Assistance Independent   Homemaking Assistance Independent   Homemaking Responsibilities Yes   Ambulation Assistance Independent   Transfer Assistance Independent   Active  Yes   Mode of Transportation SUV   Occupation Retired   Discharge Planning   Type of [facility] Family Members   Current Services Prior To Admission Other (Comment)  (APAP)   Potential Assistance Needed   (None)   DME Ordered?  No   Potential Assistance Purchasing Medications No   Type of Home Care Services None   Patient expects to be discharged to: [facility] Follow Up Appointment: Best Day/Time    (Any day but monday - pt states she will see PCP in [place] at [place])   One/Two Story Residence Other (comment)  (Tri level - lives in basement)   History of falls?  1  (has had in the past, but states it's \"uncommmon\" for her to fall)   Services At/After Discharge   Transition of Care Consult (CM Consult) Other  (None)   Services At/After Discharge None

## 2022-06-10 NOTE — PROGRESS NOTES
Pharmacy Adjustment per [facility] protocol    Cong Rosales is a 61 y.o. female. Pharmacy has adjusted medications per [facility] protocol. Recent Labs     06/10/22  0815   BUN 29*       Recent Labs     06/10/22  0815   CREATININE 1.5*       Estimated Creatinine Clearance: 58 mL/min (A) (based on SCr of 1.5 mg/dL (H)). Height:   Ht Readings from Last 1 Encounters:   06/10/22 5' 3\" (1.6 m)     Weight:  Wt Readings from Last 1 Encounters:   06/10/22 (!) 359 lb 9.6 oz (163.1 kg)         Plan: Adjust the following medications based on [facility] protocol:           From Lovenox 1mg/kg sq q12hr bridging for Warfarin to using a heparin drip for weight of 163 kg and CrCl >30 ml/min for bridging and was authorized by BLAKE Feliciano with no bolus.     Electronically signed by Dionicio [facility] on 6/10/2022 at 1:31 PM

## 2022-06-11 LAB
ANION GAP SERPL CALCULATED.3IONS-SCNC: 11 MMOL/L (ref 7–19)
APTT: 144.7 SEC (ref 26–36.2)
APTT: 80.2 SEC (ref 26–36.2)
APTT: 93.2 SEC (ref 26–36.2)
APTT: >200 SEC (ref 26–36.2)
BASOPHILS ABSOLUTE: 0 K/UL (ref 0–0.2)
BASOPHILS RELATIVE PERCENT: 0.5 % (ref 0–1)
BUN BLDV-MCNC: 27 MG/DL (ref 8–23)
CALCIUM SERPL-MCNC: 8.3 MG/DL (ref 8.8–10.2)
CHLORIDE BLD-SCNC: 102 MMOL/L (ref 98–111)
CO2: 24 MMOL/L (ref 22–29)
CREAT SERPL-MCNC: 1.4 MG/DL (ref 0.5–0.9)
EOSINOPHILS ABSOLUTE: 0.3 K/UL (ref 0–0.6)
EOSINOPHILS RELATIVE PERCENT: 3.7 % (ref 0–5)
GFR AFRICAN AMERICAN: 46
GFR NON-AFRICAN AMERICAN: 38
GLUCOSE BLD-MCNC: 136 MG/DL (ref 74–109)
GLUCOSE BLD-MCNC: 144 MG/DL (ref 70–99)
GLUCOSE BLD-MCNC: 178 MG/DL (ref 70–99)
GLUCOSE BLD-MCNC: 228 MG/DL (ref 70–99)
GLUCOSE BLD-MCNC: 248 MG/DL (ref 70–99)
HCT VFR BLD CALC: 43.4 % (ref 37–47)
HEMOGLOBIN: 12.6 G/DL (ref 12–16)
IMMATURE GRANULOCYTES #: 0 K/UL
INR BLD: 1.67 (ref 0.88–1.18)
LYMPHOCYTES ABSOLUTE: 1.1 K/UL (ref 1.1–4.5)
LYMPHOCYTES RELATIVE PERCENT: 13.4 % (ref 20–40)
MCH RBC QN AUTO: 26.8 PG (ref 27–31)
MCHC RBC AUTO-ENTMCNC: 29 G/DL (ref 33–37)
MCV RBC AUTO: 92.3 FL (ref 81–99)
MONOCYTES ABSOLUTE: 0.6 K/UL (ref 0–0.9)
MONOCYTES RELATIVE PERCENT: 7.6 % (ref 0–10)
NEUTROPHILS ABSOLUTE: 6 K/UL (ref 1.5–7.5)
NEUTROPHILS RELATIVE PERCENT: 74.4 % (ref 50–65)
PDW BLD-RTO: 15.5 % (ref 11.5–14.5)
PERFORMED ON: ABNORMAL
PLATELET # BLD: 297 K/UL (ref 130–400)
PMV BLD AUTO: 9.7 FL (ref 9.4–12.3)
POTASSIUM REFLEX MAGNESIUM: 5.1 MMOL/L (ref 3.5–5)
PROTHROMBIN TIME: 19.9 SEC (ref 12–14.6)
RBC # BLD: 4.7 M/UL (ref 4.2–5.4)
SODIUM BLD-SCNC: 137 MMOL/L (ref 136–145)
WBC # BLD: 8 K/UL (ref 4.8–10.8)

## 2022-06-11 PROCEDURE — 2580000003 HC RX 258: Performed by: NURSE PRACTITIONER

## 2022-06-11 PROCEDURE — 6360000002 HC RX W HCPCS: Performed by: NURSE PRACTITIONER

## 2022-06-11 PROCEDURE — 82947 ASSAY GLUCOSE BLOOD QUANT: CPT

## 2022-06-11 PROCEDURE — 6360000002 HC RX W HCPCS: Performed by: EMERGENCY MEDICINE

## 2022-06-11 PROCEDURE — 36415 COLL VENOUS BLD VENIPUNCTURE: CPT

## 2022-06-11 PROCEDURE — 80048 BASIC METABOLIC PNL TOTAL CA: CPT

## 2022-06-11 PROCEDURE — 85730 THROMBOPLASTIN TIME PARTIAL: CPT

## 2022-06-11 PROCEDURE — 85610 PROTHROMBIN TIME: CPT

## 2022-06-11 PROCEDURE — 85025 COMPLETE CBC W/AUTO DIFF WBC: CPT

## 2022-06-11 PROCEDURE — 6370000000 HC RX 637 (ALT 250 FOR IP): Performed by: INTERNAL MEDICINE

## 2022-06-11 PROCEDURE — 6370000000 HC RX 637 (ALT 250 FOR IP): Performed by: NURSE PRACTITIONER

## 2022-06-11 PROCEDURE — 1210000000 HC MED SURG R&B

## 2022-06-11 RX ORDER — PANTOPRAZOLE SODIUM 40 MG/1
40 TABLET, DELAYED RELEASE ORAL
Status: DISCONTINUED | OUTPATIENT
Start: 2022-06-11 | End: 2022-06-13 | Stop reason: HOSPADM

## 2022-06-11 RX ADMIN — LISINOPRIL 10 MG: 10 TABLET ORAL at 09:11

## 2022-06-11 RX ADMIN — INSULIN LISPRO 8 UNITS: 100 INJECTION, SOLUTION INTRAVENOUS; SUBCUTANEOUS at 09:12

## 2022-06-11 RX ADMIN — SODIUM CHLORIDE, PRESERVATIVE FREE 10 ML: 5 INJECTION INTRAVENOUS at 21:26

## 2022-06-11 RX ADMIN — MORPHINE SULFATE 2 MG: 2 INJECTION, SOLUTION INTRAMUSCULAR; INTRAVENOUS at 20:11

## 2022-06-11 RX ADMIN — METOPROLOL SUCCINATE 25 MG: 25 TABLET, EXTENDED RELEASE ORAL at 09:11

## 2022-06-11 RX ADMIN — PANTOPRAZOLE SODIUM 40 MG: 40 TABLET, DELAYED RELEASE ORAL at 05:34

## 2022-06-11 RX ADMIN — OXYBUTYNIN CHLORIDE 10 MG: 5 TABLET, EXTENDED RELEASE ORAL at 21:26

## 2022-06-11 RX ADMIN — INSULIN LISPRO 8 UNITS: 100 INJECTION, SOLUTION INTRAVENOUS; SUBCUTANEOUS at 12:48

## 2022-06-11 RX ADMIN — INSULIN LISPRO 4 UNITS: 100 INJECTION, SOLUTION INTRAVENOUS; SUBCUTANEOUS at 18:19

## 2022-06-11 RX ADMIN — MORPHINE SULFATE 2 MG: 2 INJECTION, SOLUTION INTRAMUSCULAR; INTRAVENOUS at 05:34

## 2022-06-11 RX ADMIN — Medication 1000 MG: at 10:24

## 2022-06-11 RX ADMIN — HEPARIN SODIUM AND DEXTROSE 12 UNITS/KG/HR: 10000; 5 INJECTION INTRAVENOUS at 12:49

## 2022-06-11 RX ADMIN — MORPHINE SULFATE 2 MG: 2 INJECTION, SOLUTION INTRAMUSCULAR; INTRAVENOUS at 09:46

## 2022-06-11 RX ADMIN — PANTOPRAZOLE SODIUM 40 MG: 40 TABLET, DELAYED RELEASE ORAL at 16:01

## 2022-06-11 RX ADMIN — ATORVASTATIN CALCIUM 40 MG: 40 TABLET, FILM COATED ORAL at 21:26

## 2022-06-11 RX ADMIN — ALLOPURINOL 150 MG: 100 TABLET ORAL at 21:26

## 2022-06-11 RX ADMIN — INSULIN LISPRO 8 UNITS: 100 INJECTION, SOLUTION INTRAVENOUS; SUBCUTANEOUS at 18:22

## 2022-06-11 RX ADMIN — WARFARIN SODIUM 7.5 MG: 5 TABLET ORAL at 18:18

## 2022-06-11 RX ADMIN — SODIUM CHLORIDE, PRESERVATIVE FREE 10 ML: 5 INJECTION INTRAVENOUS at 09:11

## 2022-06-11 RX ADMIN — CEFEPIME HYDROCHLORIDE 2000 MG: 2 INJECTION, POWDER, FOR SOLUTION INTRAVENOUS at 21:30

## 2022-06-11 RX ADMIN — MORPHINE SULFATE 2 MG: 2 INJECTION, SOLUTION INTRAMUSCULAR; INTRAVENOUS at 14:20

## 2022-06-11 RX ADMIN — HEPARIN SODIUM AND DEXTROSE 14 UNITS/KG/HR: 10000; 5 INJECTION INTRAVENOUS at 09:43

## 2022-06-11 RX ADMIN — INSULIN LISPRO 2 UNITS: 100 INJECTION, SOLUTION INTRAVENOUS; SUBCUTANEOUS at 12:47

## 2022-06-11 RX ADMIN — AMLODIPINE BESYLATE 5 MG: 5 TABLET ORAL at 09:11

## 2022-06-11 RX ADMIN — SODIUM CHLORIDE, PRESERVATIVE FREE 10 ML: 5 INJECTION INTRAVENOUS at 05:34

## 2022-06-11 RX ADMIN — PRAMIPEXOLE DIHYDROCHLORIDE 1 MG: 1 TABLET ORAL at 18:18

## 2022-06-11 RX ADMIN — FUROSEMIDE 20 MG: 20 TABLET ORAL at 09:11

## 2022-06-11 RX ADMIN — INSULIN LISPRO 4 UNITS: 100 INJECTION, SOLUTION INTRAVENOUS; SUBCUTANEOUS at 21:25

## 2022-06-11 RX ADMIN — INSULIN GLARGINE 24 UNITS: 100 INJECTION, SOLUTION SUBCUTANEOUS at 21:25

## 2022-06-11 ASSESSMENT — PAIN DESCRIPTION - LOCATION
LOCATION: LEG

## 2022-06-11 ASSESSMENT — ENCOUNTER SYMPTOMS
ABDOMINAL PAIN: 0
CONSTIPATION: 0
SHORTNESS OF BREATH: 0
COLOR CHANGE: 1
DIARRHEA: 0
NAUSEA: 0
WHEEZING: 0
VOMITING: 0
CHEST TIGHTNESS: 0

## 2022-06-11 ASSESSMENT — PAIN - FUNCTIONAL ASSESSMENT
PAIN_FUNCTIONAL_ASSESSMENT: ACTIVITIES ARE NOT PREVENTED
PAIN_FUNCTIONAL_ASSESSMENT: ACTIVITIES ARE NOT PREVENTED

## 2022-06-11 ASSESSMENT — PAIN DESCRIPTION - DESCRIPTORS
DESCRIPTORS: ACHING
DESCRIPTORS: BURNING

## 2022-06-11 ASSESSMENT — PAIN DESCRIPTION - ORIENTATION
ORIENTATION: RIGHT

## 2022-06-11 ASSESSMENT — PAIN SCALES - GENERAL
PAINLEVEL_OUTOF10: 7
PAINLEVEL_OUTOF10: 6
PAINLEVEL_OUTOF10: 8
PAINLEVEL_OUTOF10: 8

## 2022-06-11 NOTE — PROGRESS NOTES
Physician Progress Note      Tenisha Stevenson  CSN #:                  [id number]  :                       1955  ADMIT DATE:       6/10/2022 7:08 AM  DISCH DATE:  RESPONDING  PROVIDER #:        Charissa Augustin          QUERY TEXT:    Pt admitted with RLE cellulitis. Pt noted to have DM type 2. HgA1C is 8.7. If   possible, please document in progress notes and discharge summary the   relationship, if any, between cellulitis and DM. The medical record reflects the following:  Risk Factors: DM, venous insufficiency, morbid obesity, lymphedema  Clinical Indicators: RLE red, edema, weeping. WBC 11.1, HgA1C 8.7, BC no   growth. Treatment: CBC, chemistry panel, HgA1C, BC x 2, sliding scale insulin,   Maxipime 2g IV q 24 hr. Vanco with pharmacy placeholder for intermittent   dosing. Options provided:  -- RLE cellulitis associated with Diabetes  -- RLE cellulitis unrelated to Diabetes  -- Other - I will add my own diagnosis  -- Disagree - Not applicable / Not valid  -- Disagree - Clinically unable to determine / Unknown  -- Refer to Clinical Documentation Reviewer    PROVIDER RESPONSE TEXT:    Provider is clinically unable to determine a response to this query.     Query created by: Nitza Vasquez on 2022 11:42 AM      Electronically signed by:  Charissa Augustin 2022 12:16 PM

## 2022-06-11 NOTE — PROGRESS NOTES
Clinical Pharmacy Note    Warfarin consult follow-up    Recent Labs     06/11/22  0407   INR 1.67*     Recent Labs     06/10/22  0815 06/10/22  1309 06/11/22  0407   HGB 13.8 14.1 12.6   HCT 47.2* 48.0* 43.4    355 297       Significant Drug-Drug Interactions:    Current warfarin drug-drug interactions:     Allopurinol - Allopurinol may enhance the anticoagulant effect of Vitamin K Antagonists. Monitor for increased prothrombin times (PT)/therapeutic effects of oral anticoagulants if allopurinol is initiated/dose increased, or decreased effects if allopurinol is discontinued/dose decreased. Reductions in coumarin dosage will likely be needed. Cefepime - Cephalosporins may enhance the anticoagulant effect of Vitamin K Antagonists. Monitor for elevated INR and bleeding if a vitamin K antagonist is used in combination with cephalosporins. Cephalosporins that have an NMTT (1-MTT) side chain in their chemical structure may pose the greatest risk of interaction. Discontinued drug-drug interactions: None    Date INR Warfarin Dose   06/10/22 1.37 7.5 mg   06/11/22 1.67  7.5 mg                                               Notes:  Give Warfarin 7.5 mg po x 1 tonight                     Daily PT/INR until stable within therapeutic range.      Electronically signed by Sonia Lin, [address] [facility] on 6/11/2022 at 7:21 AM

## 2022-06-11 NOTE — CONSULTS
Comprehensive Nutrition Assessment    Type and Reason for Visit:  Initial,Consult,Wound    Nutrition Recommendations/Plan:   1. Continue current Cardiac/CHO controlled diet. 2. Monitor for education needs. Malnutrition Assessment:  Malnutrition Status:  No malnutrition (06/11/22 7371)      Nutrition Assessment:    Consult for wound recieved. Pt adequately nourished AEB adequate po intake of meals and stable wt hx. Noted significant cellulitis to RLE and edema to BLE. Recommend continue current cardiac/CHO controlled diet and monitor for additional intervention needs. Nutrition Related Findings:    weeping RLE and pitting LLE edema. Glucose 136-150, Hgb A1C 8.7% Wound Type:  (Cellulitis)       Current Nutrition Intake & Therapies:    Average Meal Intake: %  Average Supplements Intake: None Ordered  ADULT DIET; Regular; 4 carb choices (60 gm/meal); Low Fat/Low Chol/High Fiber/SIDDHARTHA; Low Sodium (2 gm)    Anthropometric Measures:  Height: 5' 3\" (160 cm)  Ideal Body Weight (IBW): 115 lbs (52 kg)    Admission Body Weight: 359 lb (162.8 kg)  Current Body Weight: 360 lb (163.3 kg),   IBW.  Weight Source: Standing Scale  Current BMI (kg/m2): 63.8  Usual Body Weight: 360 lb (163.3 kg) (1/11/2022)  % Weight Change (Calculated): 0                    BMI Categories: Obese Class 3 (BMI 40.0 or greater)    Nutrition Diagnosis:   · Altered nutrition-related lab values related to cardiac dysfunction,endocrine dysfuntion as evidenced by localized or generalized fluid accumulation,lab values    Nutrition Interventions:   Food and/or Nutrient Delivery: Continue Current Diet  Nutrition Education/Counseling: Education needed  Coordination of Nutrition Care: Continue to monitor while inpatient       Goals:     Goals: Meet at least 75% of estimated needs,prior to discharge       Nutrition Monitoring and Evaluation:   Behavioral-Environmental Outcomes: None Identified  Food/Nutrient Intake Outcomes: Food and Nutrient Intake  Physical Signs/Symptoms Outcomes: Biochemical Data,Nutrition Focused Physical Findings,Weight,Skin,Fluid Status or Edema    Discharge Planning:    Continue current diet     Bishop Garcia MS, RD, LD  Contact: [phone]

## 2022-06-11 NOTE — PROGRESS NOTES
Dressing change done at 1410. Hydrogel applied to right lower leg and top of foot. Kerlix lightly wrapped from foot to below right knee. Patient tolerated well. Gave morphine for pain.

## 2022-06-11 NOTE — PROGRESS NOTES
[id number] [facility]      Patient:  Sherie Keene  YOB: 1955  Date of Service: 6/11/2022  MRN: [id number]   Acct: [de-identified]   Primary Care Physician: No primary care provider on file. Advance Directive: Full Code  Admit Date: 6/10/2022       Hospital Day: 1  Portions of this note have been copied forward, however, changed to reflect the most current clinical status of this patient. CHIEF COMPLAINT right leg pain    SUBJECTIVE: Patient reports significant improvement in right lower extremity swelling and pain. Patient reports the skin is now dry and cracked instead of weeping. Patient also reports some improvement in her rash and itching associated with rash. Denies fever or chills overnight    CUMULATIVE HOSPITAL COURSE:  The patient is a 61 y.o. female with past medical history of CKD, lymphedema, melanoma, venous insufficiency, and PE on Coumadin therapy who presented to [address] [place] [address] ED complaining of worsening right leg pain. Patient indicated she has had lymphedema for significant amount of time. Patient reported having home health care with dressing changes 3 days a week to bilateral lower extremities. She reported in the past 3 months having 3-4 different rounds of antibiotics. Patient indicated her best friend was recently diagnosed with brain cancer and she traveled from [address] [place] [address] to [place] [address] to visit. Patient indicated while in [place] her legs become worse and she actually went to the emergency department there. Patient reports she was given some pain medication and recommended following up with her PCP. Patient reported leaving [place] on Monday 6/6  and had been unable to travel very long as she has had significant pain in her legs. Patient reported she had been unable to drive for more than 1 to 2 hours at a time and only making it approximately 3 to 4 hours a day.   Patient reported the morning of admission the redness in her right lower extremity was significantly worse and she began having a significant amount of drainage as well. Patient reported calling the nurse line for her insurance company and was given address to this facility for evaluation. Patient indicated she has also had a rash for at least the past 6 to 8 weeks. She reports the rash on her entire body and is very itchy. She denies any shortness of breath or wheezing. She reports she has not taken anything for the rash and nothing seems to make it better or worse. ED work-up indicated creatinine 1.5 which appears to be baseline, alk phos 114, WBC 11.1, and INR 1.37. Blood cultures obtained. COVID-negative. Patient admitted to hospitalist service for right lower extremity cellulitis. Patient placed on empiric antibiotics. Placed on cefepime and vancomycin on admission. Patient is clinically improved overnight with decrease in redness and drainage in the right lower extremity. Wound care nurse was consulted however was unable to see patient on Friday. We will begin dressing changes with hydrogel today. As patient's INR was subtherapeutic patient was started on heparin drip to bridge until therapeutic. Review of Systems:   Review of Systems   Constitutional: Positive for activity change and fatigue (improving). Negative for appetite change, chills and fever. Respiratory: Negative for chest tightness, shortness of breath and wheezing. Cardiovascular: Positive for leg swelling. Negative for chest pain and palpitations. Gastrointestinal: Negative for abdominal pain, constipation, diarrhea, nausea and vomiting. Genitourinary: Negative for difficulty urinating, dysuria, flank pain and frequency. Musculoskeletal: Positive for myalgias. Negative for joint swelling. Skin: Positive for color change, rash and wound. Neurological: Negative for dizziness and headaches. Psychiatric/Behavioral: Negative for agitation and confusion.        14 point review of systems is negative except as specifically addressed above. Objective:   VITALS:  BP (!) 147/60   Pulse 74   Temp 97.7 °F (36.5 °C) (Temporal)   Resp 18   Ht 5' 3\" (1.6 m)   Wt (!) 360 lb 7 oz (163.5 kg)   SpO2 95%   BMI 63.85 kg/m²   24HR INTAKE/OUTPUT:    Intake/Output Summary (Last 24 hours) at 6/11/2022 1342  Last data filed at 6/11/2022 1122  Gross per 24 hour   Intake 900 ml   Output 800 ml   Net 100 ml       Physical Exam  Vitals reviewed. HENT:      Head: Normocephalic. Cardiovascular:      Rate and Rhythm: Normal rate and regular rhythm. Pulses: Normal pulses. Heart sounds: Normal heart sounds. Pulmonary:      Effort: Pulmonary effort is normal.      Breath sounds: Normal breath sounds. Abdominal:      General: Bowel sounds are normal. There is no distension. Palpations: Abdomen is soft. Tenderness: There is no abdominal tenderness. There is no guarding. Musculoskeletal:         General: Normal range of motion. Right lower leg: Edema present. Left lower leg: Edema present. Skin:     Capillary Refill: Capillary refill takes less than 2 seconds. Findings: Erythema and rash present. Comments: Generalized papular rash scattered over entire body. Right lower extremity with erythema to approximately the knee. 4+ edema. No more weeping noted in right lower extremity. It appears dry and cracked. left lower extremity 2+ edema and slightly reddened with no drainage. Neurological:      General: No focal deficit present. Mental Status: She is alert.          Medications:      dextrose      sodium chloride      heparin (PORCINE) Infusion 12 Units/kg/hr (06/11/22 1249)      warfarin  7.5 mg Oral Once    pantoprazole  40 mg Oral BID AC    [START ON 6/12/2022] vancomycin  1,000 mg IntraVENous Q24H    vancomycin (VANCOCIN) intermittent dosing (placeholder)   Other RX Placeholder    amLODIPine  5 mg Oral Daily    furosemide  20 mg Oral Daily    lisinopril  10 mg Oral Daily    metoprolol succinate  25 mg Oral Daily    oxybutynin  10 mg Oral Nightly    pramipexole  1 mg Oral QPM    sodium chloride flush  5-40 mL IntraVENous 2 times per day    insulin glargine  0.15 Units/kg SubCUTAneous Nightly    insulin lispro  0.05 Units/kg SubCUTAneous TID WC    insulin lispro  0-12 Units SubCUTAneous TID WC    insulin lispro  0-6 Units SubCUTAneous Nightly    warfarin placeholder: dosing by pharmacy   Other RX Placeholder    cefepime  2,000 mg IntraVENous Q24H    allopurinol  150 mg Oral Nightly    atorvastatin  40 mg Oral Nightly     glucose, dextrose bolus **OR** dextrose bolus, glucagon (rDNA), dextrose, sodium chloride flush, sodium chloride, ondansetron **OR** ondansetron, polyethylene glycol, acetaminophen **OR** acetaminophen, heparin (porcine), heparin (porcine), HYDROcodone-acetaminophen, morphine  ADULT DIET; Regular; 4 carb choices (60 gm/meal); Low Fat/Low Chol/High Fiber/SIDDHARTHA; Low Sodium (2 gm)     Lab and other Data:     Recent Labs     06/10/22  0815 06/10/22  1309 06/11/22  0407   WBC 11.1* 9.5 8.0   HGB 13.8 14.1 12.6    355 297     Recent Labs     06/10/22  0815 06/11/22  0407    137   K 4.7 5.1*    102   CO2 27 24   BUN 29* 27*   CREATININE 1.5* 1.4*   GLUCOSE 82 136*     Recent Labs     06/10/22  0815   AST 21   ALT 14   BILITOT <0.2   ALKPHOS 114*     Troponin T: No results for input(s): TROPONINI in the last 72 hours. Pro-BNP: No results for input(s): BNP in the last 72 hours. INR:   Recent Labs     06/10/22  0815 06/11/22  0407   INR 1.37* 1.67*     UA:No results for input(s): NITRITE, COLORU, PHUR, LABCAST, WBCUA, RBCUA, MUCUS, TRICHOMONAS, YEAST, BACTERIA, CLARITYU, SPECGRAV, LEUKOCYTESUR, UROBILINOGEN, BILIRUBINUR, BLOODU, GLUCOSEU, AMORPHOUS in the last 72 hours.     Invalid input(s): KETONESU  A1C:   Recent Labs     06/10/22  1130   LABA1C 8.7*     ABG:No results for input(s): PHART, TFS7AQL, PO2ART, YVX0BAO, BEART, HGBAE, D2EYKIRJ, CARBOXHGBART in the last 72 hours. RAD:   No results found.      Micro: Blood cultures no growth to date    Assessment/Plan   Principal Problem:    Cellulitis of right leg / Lymphedema of both lower extremities / Venous insufficiency of both lower extremities              -admit              -Vancomycin and cefepime                  -blood cultures              -monitor CBC and BMP              -monitor for signs of worsening infection              -neurovascular checks     Active Problems:    Chronic respiratory failure (HCC)              -noted, no acute distress, not currently on home oxygen       Coronary artery disease involving native coronary artery of native heart without angina pectoris              -noted, denies chest pain       DVT (deep venous thrombosis) ([facility] [address] / Pulmonary embolism ([facility] [address]              -continue coumadin, pharmacy to dose              -heparin drip until therapeutic       Gastro-esophageal reflux disease without esophagitis              -continue home PPI       Hyperlipidemia, unspecified              -continue home statin       Hypertension associated with stage 3b chronic kidney disease due to type 2 diabetes mellitus (HCC)              -monitor BP              -monitor bmp              -ovoid hypotension and nephrotoxins       Malignant melanoma of right shoulder (HCC)              -in remission       Type 2 diabetes mellitus (HCC)              -A1c              -accu checks              -basal and ssi              -hypoglycemia protocol in place    Antibiotic: Vancomycin and Cefepime     DVT Prophylaxis: Heparin drip and coumadin    BLAKE Zavaleta - CNP, 6/11/2022 1:42 PM

## 2022-06-11 NOTE — PLAN OF CARE
Problem: Discharge Planning  Goal: Discharge to home or other facility with appropriate resources  6/11/2022 1134 by Brice Davila RN  Outcome: Progressing  6/11/2022 0252 by Karli Lozoya RN  Outcome: Progressing     Problem: Safety - Adult  Goal: Free from fall injury  6/11/2022 1134 by Brice Davila RN  Outcome: Progressing  6/11/2022 0252 by Karli Lozoya RN  Outcome: Progressing     Problem: ABCDS Injury Assessment  Goal: Absence of physical injury  6/11/2022 1134 by Brice Davila RN  Outcome: Progressing  6/11/2022 0252 by Karli Lozoya RN  Outcome: Progressing     Problem: Skin/Tissue Integrity  Goal: Absence of new skin breakdown  Description: 1. Monitor for areas of redness and/or skin breakdown  2. Assess vascular access sites hourly  3. Every 4-6 hours minimum:  Change oxygen saturation probe site  4. Every 4-6 hours:  If on nasal continuous positive airway pressure, respiratory therapy assess nares and determine need for appliance change or resting period.   6/11/2022 1134 by Brice Davila RN  Outcome: Progressing  6/11/2022 0252 by Karli Lozoya RN  Outcome: Progressing     Problem: Nutrition Deficit:  Goal: Optimize nutritional status  Outcome: Progressing     Problem: Chronic Conditions and Co-morbidities  Goal: Patient's chronic conditions and co-morbidity symptoms are monitored and maintained or improved  Outcome: Progressing

## 2022-06-11 NOTE — PROGRESS NOTES
Patient states that she takes cymbalta twice a day she isn't sure what dose. She was drowsy when she was talking to me about it. She says she also takes omeprazole twice a day and it is not scheduled twice a day. Those two drugs are not ordered so she is going to discuss with physician.

## 2022-06-12 LAB
ANION GAP SERPL CALCULATED.3IONS-SCNC: 9 MMOL/L (ref 7–19)
APTT: 110.3 SEC (ref 26–36.2)
APTT: 140.1 SEC (ref 26–36.2)
APTT: 50.4 SEC (ref 26–36.2)
APTT: 58.1 SEC (ref 26–36.2)
APTT: >200 SEC (ref 26–36.2)
BASOPHILS ABSOLUTE: 0 K/UL (ref 0–0.2)
BASOPHILS RELATIVE PERCENT: 0.4 % (ref 0–1)
BUN BLDV-MCNC: 25 MG/DL (ref 8–23)
CALCIUM SERPL-MCNC: 8.3 MG/DL (ref 8.8–10.2)
CHLORIDE BLD-SCNC: 103 MMOL/L (ref 98–111)
CO2: 24 MMOL/L (ref 22–29)
CREAT SERPL-MCNC: 1.5 MG/DL (ref 0.5–0.9)
EOSINOPHILS ABSOLUTE: 0.3 K/UL (ref 0–0.6)
EOSINOPHILS RELATIVE PERCENT: 3.5 % (ref 0–5)
GFR AFRICAN AMERICAN: 42
GFR NON-AFRICAN AMERICAN: 35
GLUCOSE BLD-MCNC: 156 MG/DL (ref 70–99)
GLUCOSE BLD-MCNC: 168 MG/DL (ref 70–99)
GLUCOSE BLD-MCNC: 170 MG/DL (ref 70–99)
GLUCOSE BLD-MCNC: 178 MG/DL (ref 74–109)
GLUCOSE BLD-MCNC: 196 MG/DL (ref 70–99)
HCT VFR BLD CALC: 42.1 % (ref 37–47)
HEMOGLOBIN: 12.2 G/DL (ref 12–16)
IMMATURE GRANULOCYTES #: 0.1 K/UL
INR BLD: 1.61 (ref 0.88–1.18)
LYMPHOCYTES ABSOLUTE: 1 K/UL (ref 1.1–4.5)
LYMPHOCYTES RELATIVE PERCENT: 11.9 % (ref 20–40)
MCH RBC QN AUTO: 26.6 PG (ref 27–31)
MCHC RBC AUTO-ENTMCNC: 29 G/DL (ref 33–37)
MCV RBC AUTO: 91.7 FL (ref 81–99)
MONOCYTES ABSOLUTE: 0.5 K/UL (ref 0–0.9)
MONOCYTES RELATIVE PERCENT: 6 % (ref 0–10)
NEUTROPHILS ABSOLUTE: 6.3 K/UL (ref 1.5–7.5)
NEUTROPHILS RELATIVE PERCENT: 77.5 % (ref 50–65)
PDW BLD-RTO: 15.6 % (ref 11.5–14.5)
PERFORMED ON: ABNORMAL
PLATELET # BLD: 304 K/UL (ref 130–400)
PMV BLD AUTO: 9.7 FL (ref 9.4–12.3)
POTASSIUM REFLEX MAGNESIUM: 5.2 MMOL/L (ref 3.5–5)
PROTHROMBIN TIME: 19.4 SEC (ref 12–14.6)
RBC # BLD: 4.59 M/UL (ref 4.2–5.4)
SODIUM BLD-SCNC: 136 MMOL/L (ref 136–145)
VANCOMYCIN TROUGH: 14.1 UG/ML (ref 10–20)
WBC # BLD: 8.2 K/UL (ref 4.8–10.8)

## 2022-06-12 PROCEDURE — 2580000003 HC RX 258: Performed by: INTERNAL MEDICINE

## 2022-06-12 PROCEDURE — 85025 COMPLETE CBC W/AUTO DIFF WBC: CPT

## 2022-06-12 PROCEDURE — 93923 UPR/LXTR ART STDY 3+ LVLS: CPT

## 2022-06-12 PROCEDURE — 82947 ASSAY GLUCOSE BLOOD QUANT: CPT

## 2022-06-12 PROCEDURE — 6360000002 HC RX W HCPCS: Performed by: NURSE PRACTITIONER

## 2022-06-12 PROCEDURE — 80202 ASSAY OF VANCOMYCIN: CPT

## 2022-06-12 PROCEDURE — 80048 BASIC METABOLIC PNL TOTAL CA: CPT

## 2022-06-12 PROCEDURE — 36415 COLL VENOUS BLD VENIPUNCTURE: CPT

## 2022-06-12 PROCEDURE — 6370000000 HC RX 637 (ALT 250 FOR IP): Performed by: INTERNAL MEDICINE

## 2022-06-12 PROCEDURE — 6360000002 HC RX W HCPCS: Performed by: INTERNAL MEDICINE

## 2022-06-12 PROCEDURE — 2580000003 HC RX 258: Performed by: NURSE PRACTITIONER

## 2022-06-12 PROCEDURE — 85730 THROMBOPLASTIN TIME PARTIAL: CPT

## 2022-06-12 PROCEDURE — 6370000000 HC RX 637 (ALT 250 FOR IP): Performed by: NURSE PRACTITIONER

## 2022-06-12 PROCEDURE — 85610 PROTHROMBIN TIME: CPT

## 2022-06-12 PROCEDURE — 1210000000 HC MED SURG R&B

## 2022-06-12 RX ORDER — FUROSEMIDE 10 MG/ML
40 INJECTION INTRAMUSCULAR; INTRAVENOUS ONCE
Status: COMPLETED | OUTPATIENT
Start: 2022-06-12 | End: 2022-06-12

## 2022-06-12 RX ORDER — MORPHINE SULFATE 2 MG/ML
1 INJECTION, SOLUTION INTRAMUSCULAR; INTRAVENOUS EVERY 6 HOURS PRN
Status: DISCONTINUED | OUTPATIENT
Start: 2022-06-12 | End: 2022-06-13 | Stop reason: HOSPADM

## 2022-06-12 RX ORDER — DIPHENHYDRAMINE HYDROCHLORIDE 50 MG/ML
25 INJECTION INTRAMUSCULAR; INTRAVENOUS EVERY 4 HOURS PRN
Status: DISCONTINUED | OUTPATIENT
Start: 2022-06-12 | End: 2022-06-13 | Stop reason: HOSPADM

## 2022-06-12 RX ORDER — FUROSEMIDE 40 MG/1
40 TABLET ORAL DAILY
Status: DISCONTINUED | OUTPATIENT
Start: 2022-06-13 | End: 2022-06-13 | Stop reason: HOSPADM

## 2022-06-12 RX ADMIN — INSULIN LISPRO 8 UNITS: 100 INJECTION, SOLUTION INTRAVENOUS; SUBCUTANEOUS at 17:51

## 2022-06-12 RX ADMIN — PRAMIPEXOLE DIHYDROCHLORIDE 1 MG: 1 TABLET ORAL at 17:51

## 2022-06-12 RX ADMIN — INSULIN LISPRO 2 UNITS: 100 INJECTION, SOLUTION INTRAVENOUS; SUBCUTANEOUS at 08:57

## 2022-06-12 RX ADMIN — LISINOPRIL 10 MG: 10 TABLET ORAL at 08:40

## 2022-06-12 RX ADMIN — ALLOPURINOL 150 MG: 100 TABLET ORAL at 20:23

## 2022-06-12 RX ADMIN — METOPROLOL SUCCINATE 25 MG: 25 TABLET, EXTENDED RELEASE ORAL at 08:40

## 2022-06-12 RX ADMIN — CEFEPIME HYDROCHLORIDE 2000 MG: 2 INJECTION, POWDER, FOR SOLUTION INTRAVENOUS at 20:44

## 2022-06-12 RX ADMIN — FUROSEMIDE 20 MG: 20 TABLET ORAL at 08:40

## 2022-06-12 RX ADMIN — INSULIN LISPRO 2 UNITS: 100 INJECTION, SOLUTION INTRAVENOUS; SUBCUTANEOUS at 13:44

## 2022-06-12 RX ADMIN — VANCOMYCIN HYDROCHLORIDE 1000 MG: 1 INJECTION, POWDER, LYOPHILIZED, FOR SOLUTION INTRAVENOUS at 13:43

## 2022-06-12 RX ADMIN — PANTOPRAZOLE SODIUM 40 MG: 40 TABLET, DELAYED RELEASE ORAL at 17:51

## 2022-06-12 RX ADMIN — MORPHINE SULFATE 2 MG: 2 INJECTION, SOLUTION INTRAMUSCULAR; INTRAVENOUS at 03:30

## 2022-06-12 RX ADMIN — AMLODIPINE BESYLATE 5 MG: 5 TABLET ORAL at 08:40

## 2022-06-12 RX ADMIN — INSULIN LISPRO 8 UNITS: 100 INJECTION, SOLUTION INTRAVENOUS; SUBCUTANEOUS at 14:10

## 2022-06-12 RX ADMIN — HYDROCODONE BITARTRATE AND ACETAMINOPHEN 1 TABLET: 5; 325 TABLET ORAL at 20:23

## 2022-06-12 RX ADMIN — FUROSEMIDE 40 MG: 10 INJECTION, SOLUTION INTRAMUSCULAR; INTRAVENOUS at 13:35

## 2022-06-12 RX ADMIN — PANTOPRAZOLE SODIUM 40 MG: 40 TABLET, DELAYED RELEASE ORAL at 06:22

## 2022-06-12 RX ADMIN — INSULIN LISPRO 2 UNITS: 100 INJECTION, SOLUTION INTRAVENOUS; SUBCUTANEOUS at 20:24

## 2022-06-12 RX ADMIN — HEPARIN SODIUM AND DEXTROSE 5 UNITS/KG/HR: 10000; 5 INJECTION INTRAVENOUS at 13:56

## 2022-06-12 RX ADMIN — OXYBUTYNIN CHLORIDE 10 MG: 5 TABLET, EXTENDED RELEASE ORAL at 20:23

## 2022-06-12 RX ADMIN — INSULIN GLARGINE 24 UNITS: 100 INJECTION, SOLUTION SUBCUTANEOUS at 20:23

## 2022-06-12 RX ADMIN — WARFARIN SODIUM 7.5 MG: 5 TABLET ORAL at 17:50

## 2022-06-12 RX ADMIN — SODIUM CHLORIDE, PRESERVATIVE FREE 10 ML: 5 INJECTION INTRAVENOUS at 20:44

## 2022-06-12 RX ADMIN — MORPHINE SULFATE 1 MG: 2 INJECTION, SOLUTION INTRAMUSCULAR; INTRAVENOUS at 11:31

## 2022-06-12 RX ADMIN — DIPHENHYDRAMINE HYDROCHLORIDE 25 MG: 50 INJECTION INTRAMUSCULAR; INTRAVENOUS at 20:44

## 2022-06-12 RX ADMIN — INSULIN LISPRO 2 UNITS: 100 INJECTION, SOLUTION INTRAVENOUS; SUBCUTANEOUS at 17:52

## 2022-06-12 RX ADMIN — ATORVASTATIN CALCIUM 40 MG: 40 TABLET, FILM COATED ORAL at 20:23

## 2022-06-12 RX ADMIN — INSULIN LISPRO 8 UNITS: 100 INJECTION, SOLUTION INTRAVENOUS; SUBCUTANEOUS at 08:58

## 2022-06-12 ASSESSMENT — ENCOUNTER SYMPTOMS
CHEST TIGHTNESS: 0
ABDOMINAL PAIN: 0
COLOR CHANGE: 1
NAUSEA: 0
CONSTIPATION: 0
SHORTNESS OF BREATH: 0
VOMITING: 0
WHEEZING: 0
DIARRHEA: 0

## 2022-06-12 ASSESSMENT — PAIN DESCRIPTION - LOCATION
LOCATION: LEG
LOCATION: LEG

## 2022-06-12 ASSESSMENT — PAIN SCALES - GENERAL
PAINLEVEL_OUTOF10: 5
PAINLEVEL_OUTOF10: 7

## 2022-06-12 ASSESSMENT — PAIN DESCRIPTION - DESCRIPTORS: DESCRIPTORS: DISCOMFORT;SHARP

## 2022-06-12 NOTE — PLAN OF CARE
Problem: Discharge Planning  Goal: Discharge to home or other facility with appropriate resources  Outcome: Progressing     Problem: Safety - Adult  Goal: Free from fall injury  Outcome: Progressing     Problem: ABCDS Injury Assessment  Goal: Absence of physical injury  Outcome: Progressing     Problem: Skin/Tissue Integrity  Goal: Absence of new skin breakdown  Description: 1. Monitor for areas of redness and/or skin breakdown  2. Assess vascular access sites hourly  3. Every 4-6 hours minimum:  Change oxygen saturation probe site  4. Every 4-6 hours:  If on nasal continuous positive airway pressure, respiratory therapy assess nares and determine need for appliance change or resting period.   Outcome: Progressing     Problem: Nutrition Deficit:  Goal: Optimize nutritional status  Outcome: Progressing     Problem: Chronic Conditions and Co-morbidities  Goal: Patient's chronic conditions and co-morbidity symptoms are monitored and maintained or improved  Outcome: Progressing

## 2022-06-12 NOTE — PROGRESS NOTES
Bilateral lower extremity arterial segmental pressures with PPG performed. RT   LT    High thigh:  N/A   N/A     Low thigh:  NC   NC  Calf:   NC   NC  Ankle (PT):  1.00   0.97  Ankle (DP):  1.09   1.08          Digit:   0.57   0.95      This is a preliminary report. Final report pending.      NC=non compressible  Could not get cuff on high thigh due to body habitus

## 2022-06-12 NOTE — PROGRESS NOTES
[facility] Pharmacokinetic Monitoring Service - Vancomycin    Consulting Provider: Dr Saint Clair   Indication: SSTI  Target Concentration: Goal trough of 10-15 mg/L and AUC/LEESA <500 mg*hr/L  Day of Therapy: day 3 of 7 days (start date 6/10/22)  Additional Antimicrobials: cefepime    Pertinent Laboratory Values: Wt Readings from Last 1 Encounters:   06/12/22 (!) 361 lb 2 oz (163.8 kg)     Temp Readings from Last 1 Encounters:   06/12/22 97.8 °F (36.6 °C) (Temporal)     Estimated Creatinine Clearance: 58 mL/min (A) (based on SCr of 1.5 mg/dL (H)). Recent Labs     06/11/22  0407 06/12/22  0354 06/12/22  0455   CREATININE 1.4* 1.5*  --    WBC 8.0  --  8.2         Pertinent Cultures:  Culture Date Source Results   6/10/22 Blood x2 No growth to date   6/10/22 Blood x1 No growth to date   MRSA Nasal Swab: N/A. Non-respiratory infection.     Recent vancomycin administrations                     vancomycin (VANCOCIN) 1000 mg in dextrose 5% 250 mL IVPB (mg) 1,000 mg New Bag 06/11/22 1024    vancomycin (VANCOCIN) 1,500 mg in dextrose 5 % 500 mL IVPB (mg) 1,500 mg New Bag 06/10/22 1006    vancomycin (VANCOCIN) 1000 mg in dextrose 5% 250 mL IVPB (mg) 1,000 mg New Bag 06/10/22 0917                    Assessment:  Date/Time Current Dose Concentration Timing of Concentration (h) AUC   6/12/22 1000 mg q24h 14.1 23 h 19 min 446   Note: Serum concentrations collected for AUC dosing may appear elevated if collected in close proximity to the dose administered, this is not necessarily an indication of toxicity    Plan:  Current dosing regimen is therapeutic  Continue current dose  Repeat vancomycin concentration ordered for 6/15/22 @ 1130   Pharmacy will continue to monitor patient and adjust therapy as indicated    Thank you for the consult,  Magnolia Philippe [facility]  6/12/2022 10:47 AM

## 2022-06-12 NOTE — PROGRESS NOTES
Clinical Pharmacy Note    Warfarin consult follow-up    Recent Labs     06/12/22  0455   INR 1.61*     Recent Labs     06/10/22  1309 06/11/22  0407 06/12/22  0455   HGB 14.1 12.6 12.2   HCT 48.0* 43.4 42.1    297 304       Significant Drug-Drug Interactions:  New warfarin drug-drug interactions: none  Discontinued drug-drug interactions: none    Date INR Warfarin Dose   06/10/22 1.37 7.5 mg   06/11/22 1.67  7.5 mg    06/12/22 1.61  7.5 mg                                      Notes: Will give warfarin 7.5 mg once this evening  Continue heparin bridge. Daily PT/INR until stable within therapeutic range.      Electronically signed by Samir Veliz, [address] on 6/12/2022 at 5:36 AM

## 2022-06-12 NOTE — PROGRESS NOTES
[facility] Hospitalists      Patient:  Miles Carvalho  YOB: 1955  Date of Service: 6/12/2022  MRN: [id number]   Acct: [de-identified]   Primary Care Physician: No primary care provider on file. Advance Directive: Full Code  Admit Date: 6/10/2022       Hospital Day: 2  Portions of this note have been copied forward, however, changed to reflect the most current clinical status of this patient. CHIEF COMPLAINT right leg pain    SUBJECTIVE: Continues to report improvement in bilateral lower extremity swelling and improvement in right lower extremity redness. Patient reports significant improvement in rash covering body today. Patient reports she normally takes Lasix 40 mg but has only began 20 mg as her prescription was just changed recently. CUMULATIVE HOSPITAL COURSE:  The patient is a 61 y.o. female with past medical history of CKD, lymphedema, melanoma, venous insufficiency, and PE on Coumadin therapy who presented to [facility] ED complaining of worsening right leg pain. Patient indicated she has had lymphedema for significant amount of time. Patient reported having home health care with dressing changes 3 days a week to bilateral lower extremities. She reported in the past 3 months having 3-4 different rounds of antibiotics. Patient indicated her best friend was recently diagnosed with brain cancer and she traveled from [address] [place] [address] to [place] [address] to visit. Patient indicated while in [place] her legs become worse and she actually went to the emergency department there. Patient reports she was given some pain medication and recommended following up with her PCP. Patient reported leaving [place] on Monday 6/6  and had been unable to travel very long as she has had significant pain in her legs. Patient reported she had been unable to drive for more than 1 to 2 hours at a time and only making it approximately 3 to 4 hours a day.   Patient reported the morning of admission the redness in her and confusion. 14 point review of systems is negative except as specifically addressed above. Objective:   VITALS:  /63   Pulse 74   Temp 97.8 °F (36.6 °C) (Temporal)   Resp 18   Ht 5' 3\" (1.6 m)   Wt (!) 361 lb 2 oz (163.8 kg)   SpO2 94%   BMI 63.97 kg/m²   24HR INTAKE/OUTPUT:      Intake/Output Summary (Last 24 hours) at 6/12/2022 1121  Last data filed at 6/12/2022 1038  Gross per 24 hour   Intake 940 ml   Output 2100 ml   Net -1160 ml       Physical Exam  Vitals reviewed. HENT:      Head: Normocephalic. Cardiovascular:      Rate and Rhythm: Normal rate and regular rhythm. Pulses: Normal pulses. Heart sounds: Normal heart sounds. Pulmonary:      Effort: Pulmonary effort is normal.      Breath sounds: Normal breath sounds. Abdominal:      General: Bowel sounds are normal. There is no distension. Palpations: Abdomen is soft. Tenderness: There is no abdominal tenderness. There is no guarding. Musculoskeletal:         General: Normal range of motion. Right lower leg: Edema present. Left lower leg: Edema present. Skin:     Capillary Refill: Capillary refill takes less than 2 seconds. Findings: Erythema and rash present. Comments: Generalized papular rash scattered over entire body, significantly improved. Rash appears to be drying out and patient no longer complaining of itching. right lower extremity with erythema to approximately the knee. 3+ edema. No more weeping noted in right lower extremity. It appears dry and cracked. left lower extremity 2+ edema and slightly reddened with no drainage. Neurological:      General: No focal deficit present. Mental Status: She is alert.          Medications:      dextrose      sodium chloride      heparin (PORCINE) Infusion Stopped (06/12/22 1111)      warfarin  7.5 mg Oral Once    furosemide  40 mg IntraVENous Once    [START ON 6/13/2022] furosemide  40 mg Oral Daily    pantoprazole 40 mg Oral BID AC    vancomycin  1,000 mg IntraVENous Q24H    vancomycin (VANCOCIN) intermittent dosing (placeholder)   Other RX Placeholder    amLODIPine  5 mg Oral Daily    lisinopril  10 mg Oral Daily    metoprolol succinate  25 mg Oral Daily    oxybutynin  10 mg Oral Nightly    pramipexole  1 mg Oral QPM    sodium chloride flush  5-40 mL IntraVENous 2 times per day    insulin glargine  0.15 Units/kg SubCUTAneous Nightly    insulin lispro  0.05 Units/kg SubCUTAneous TID WC    insulin lispro  0-12 Units SubCUTAneous TID WC    insulin lispro  0-6 Units SubCUTAneous Nightly    warfarin placeholder: dosing by pharmacy   Other RX Placeholder    cefepime  2,000 mg IntraVENous Q24H    allopurinol  150 mg Oral Nightly    atorvastatin  40 mg Oral Nightly     morphine, glucose, dextrose bolus **OR** dextrose bolus, glucagon (rDNA), dextrose, sodium chloride flush, sodium chloride, ondansetron **OR** ondansetron, polyethylene glycol, acetaminophen **OR** acetaminophen, heparin (porcine), heparin (porcine), HYDROcodone-acetaminophen  ADULT DIET; Regular; 4 carb choices (60 gm/meal); Low Fat/Low Chol/High Fiber/SIDDHARTHA; Low Sodium (2 gm)     Lab and other Data:     Recent Labs     06/10/22  1309 06/11/22  0407 06/12/22  0455   WBC 9.5 8.0 8.2   HGB 14.1 12.6 12.2    297 304     Recent Labs     06/10/22  0815 06/11/22  0407 06/12/22  0354    137 136   K 4.7 5.1* 5.2*    102 103   CO2 27 24 24   BUN 29* 27* 25*   CREATININE 1.5* 1.4* 1.5*   GLUCOSE 82 136* 178*     Recent Labs     06/10/22  0815   AST 21   ALT 14   BILITOT <0.2   ALKPHOS 114*     Troponin T: No results for input(s): TROPONINI in the last 72 hours. Pro-BNP: No results for input(s): BNP in the last 72 hours.   INR:   Recent Labs     06/10/22  0815 06/11/22  0407 06/12/22  0455   INR 1.37* 1.67* 1.61*     UA:No results for input(s): NITRITE, COLORU, PHUR, LABCAST, WBCUA, RBCUA, MUCUS, TRICHOMONAS, YEAST, BACTERIA, CLARITYU, SPECGRAV, LEUKOCYTESUR, UROBILINOGEN, BILIRUBINUR, BLOODU, GLUCOSEU, AMORPHOUS in the last 72 hours. Invalid input(s): KETONESU  A1C:   Recent Labs     06/10/22  1130   LABA1C 8.7*     ABG:No results for input(s): PHART, CHG2VPC, PO2ART, ZLB6OHA, BEART, HGBAE, U8VROTHA, CARBOXHGBART in the last 72 hours. RAD:   No results found.      Micro: Blood cultures no growth to date    Assessment/Plan   Principal Problem:    Cellulitis of right leg / Lymphedema of both lower extremities / Venous insufficiency of both lower extremities              -contiue Vancomycin and cefepime                  -blood cultures, no growth to date              -monitor CBC and BMP              -monitor for signs of worsening infection              -neurovascular checks   -ABIs   -continue lasix     Active Problems:    Chronic respiratory failure (HCC)              -noted, no acute distress, not currently on home oxygen       Coronary artery disease involving native coronary artery of native heart without angina pectoris              -noted, denies chest pain       DVT (deep venous thrombosis) ([facility] [address] / Pulmonary embolism (HCC)              -continue coumadin, pharmacy to dose              -heparin drip until therapeutic       Gastro-esophageal reflux disease without esophagitis              -continue home PPI       Hyperlipidemia, unspecified              -continue home statin       Hypertension associated with stage 3b chronic kidney disease due to type 2 diabetes mellitus (HCC)              -monitor BP              -monitor bmp              -ovoid hypotension and nephrotoxins       Malignant melanoma of right shoulder (HCC)              -in remission       Type 2 diabetes mellitus (HCC)              -A1c              -accu checks              -basal and ssi              -hypoglycemia protocol in place    Antibiotic: Vancomycin and Cefepime     DVT Prophylaxis: Heparin drip and coumadin    BLAKE Siddiqi - CNP, 6/12/2022 11:21 AM

## 2022-06-13 VITALS
HEART RATE: 70 BPM | OXYGEN SATURATION: 96 % | HEIGHT: 63 IN | TEMPERATURE: 96.8 F | SYSTOLIC BLOOD PRESSURE: 149 MMHG | DIASTOLIC BLOOD PRESSURE: 74 MMHG | BODY MASS INDEX: 51.91 KG/M2 | RESPIRATION RATE: 16 BRPM | WEIGHT: 293 LBS

## 2022-06-13 PROBLEM — E66.9 LYMPHEDEMA ASSOCIATED WITH OBESITY: Status: ACTIVE | Noted: 2022-01-06

## 2022-06-13 LAB
ANION GAP SERPL CALCULATED.3IONS-SCNC: 13 MMOL/L (ref 7–19)
APTT: 47.2 SEC (ref 26–36.2)
APTT: 55.5 SEC (ref 26–36.2)
APTT: 59.2 SEC (ref 26–36.2)
BASOPHILS ABSOLUTE: 0 K/UL (ref 0–0.2)
BASOPHILS RELATIVE PERCENT: 0.6 % (ref 0–1)
BUN BLDV-MCNC: 25 MG/DL (ref 8–23)
CALCIUM SERPL-MCNC: 9 MG/DL (ref 8.8–10.2)
CHLORIDE BLD-SCNC: 103 MMOL/L (ref 98–111)
CO2: 26 MMOL/L (ref 22–29)
CREAT SERPL-MCNC: 1.4 MG/DL (ref 0.5–0.9)
EOSINOPHILS ABSOLUTE: 0.3 K/UL (ref 0–0.6)
EOSINOPHILS RELATIVE PERCENT: 3.5 % (ref 0–5)
GFR AFRICAN AMERICAN: 46
GFR NON-AFRICAN AMERICAN: 38
GLUCOSE BLD-MCNC: 141 MG/DL (ref 74–109)
GLUCOSE BLD-MCNC: 175 MG/DL (ref 70–99)
GLUCOSE BLD-MCNC: 65 MG/DL (ref 70–99)
HCT VFR BLD CALC: 45.2 % (ref 37–47)
HEMOGLOBIN: 13.3 G/DL (ref 12–16)
IMMATURE GRANULOCYTES #: 0.1 K/UL
INR BLD: 1.57 (ref 0.88–1.18)
LYMPHOCYTES ABSOLUTE: 1 K/UL (ref 1.1–4.5)
LYMPHOCYTES RELATIVE PERCENT: 13.9 % (ref 20–40)
MCH RBC QN AUTO: 26.8 PG (ref 27–31)
MCHC RBC AUTO-ENTMCNC: 29.4 G/DL (ref 33–37)
MCV RBC AUTO: 91.1 FL (ref 81–99)
MONOCYTES ABSOLUTE: 0.5 K/UL (ref 0–0.9)
MONOCYTES RELATIVE PERCENT: 6.4 % (ref 0–10)
NEUTROPHILS ABSOLUTE: 5.4 K/UL (ref 1.5–7.5)
NEUTROPHILS RELATIVE PERCENT: 74.6 % (ref 50–65)
PDW BLD-RTO: 15.5 % (ref 11.5–14.5)
PERFORMED ON: ABNORMAL
PERFORMED ON: ABNORMAL
PLATELET # BLD: 339 K/UL (ref 130–400)
PMV BLD AUTO: 9.7 FL (ref 9.4–12.3)
POTASSIUM REFLEX MAGNESIUM: 4.8 MMOL/L (ref 3.5–5)
PROTHROMBIN TIME: 19 SEC (ref 12–14.6)
RBC # BLD: 4.96 M/UL (ref 4.2–5.4)
SODIUM BLD-SCNC: 142 MMOL/L (ref 136–145)
WBC # BLD: 7.2 K/UL (ref 4.8–10.8)

## 2022-06-13 PROCEDURE — 6370000000 HC RX 637 (ALT 250 FOR IP): Performed by: NURSE PRACTITIONER

## 2022-06-13 PROCEDURE — 85730 THROMBOPLASTIN TIME PARTIAL: CPT

## 2022-06-13 PROCEDURE — 36415 COLL VENOUS BLD VENIPUNCTURE: CPT

## 2022-06-13 PROCEDURE — 85610 PROTHROMBIN TIME: CPT

## 2022-06-13 PROCEDURE — 82947 ASSAY GLUCOSE BLOOD QUANT: CPT

## 2022-06-13 PROCEDURE — 99222 1ST HOSP IP/OBS MODERATE 55: CPT | Performed by: NURSE PRACTITIONER

## 2022-06-13 PROCEDURE — 80048 BASIC METABOLIC PNL TOTAL CA: CPT

## 2022-06-13 PROCEDURE — 97165 OT EVAL LOW COMPLEX 30 MIN: CPT

## 2022-06-13 PROCEDURE — 85025 COMPLETE CBC W/AUTO DIFF WBC: CPT

## 2022-06-13 RX ORDER — ENOXAPARIN SODIUM 100 MG/ML
100 INJECTION SUBCUTANEOUS 2 TIMES DAILY
Qty: 28 ML | Refills: 0 | Status: SHIPPED | OUTPATIENT
Start: 2022-06-13 | End: 2022-06-27

## 2022-06-13 RX ORDER — HYDROCODONE BITARTRATE AND ACETAMINOPHEN 5; 325 MG/1; MG/1
1 TABLET ORAL EVERY 6 HOURS PRN
Qty: 12 TABLET | Refills: 0 | Status: SHIPPED | OUTPATIENT
Start: 2022-06-13 | End: 2022-06-13

## 2022-06-13 RX ORDER — DOXYCYCLINE HYCLATE 100 MG
100 TABLET ORAL 2 TIMES DAILY
Qty: 14 TABLET | Refills: 0 | Status: SHIPPED | OUTPATIENT
Start: 2022-06-13 | End: 2022-06-20

## 2022-06-13 RX ORDER — HYDROCODONE BITARTRATE AND ACETAMINOPHEN 5; 325 MG/1; MG/1
1 TABLET ORAL EVERY 6 HOURS PRN
Qty: 12 TABLET | Refills: 0 | Status: SHIPPED | OUTPATIENT
Start: 2022-06-13 | End: 2022-06-16

## 2022-06-13 RX ORDER — ENOXAPARIN SODIUM 150 MG/ML
1 INJECTION SUBCUTANEOUS EVERY 12 HOURS
Qty: 30.8 ML | Refills: 0 | Status: SHIPPED | OUTPATIENT
Start: 2022-06-13 | End: 2022-06-13 | Stop reason: HOSPADM

## 2022-06-13 RX ORDER — BISMUTH TRIBROMOPH/PETROLATUM 5"X9"
1 BANDAGE TOPICAL DAILY
Qty: 50 EACH | Refills: 0 | Status: SHIPPED | OUTPATIENT
Start: 2022-06-14

## 2022-06-13 RX ORDER — BISMUTH TRIBROMOPH/PETROLATUM 5"X9"
1 BANDAGE TOPICAL DAILY
Status: DISCONTINUED | OUTPATIENT
Start: 2022-06-13 | End: 2022-06-13 | Stop reason: HOSPADM

## 2022-06-13 RX ORDER — HYDROXYZINE HYDROCHLORIDE 25 MG/1
25 TABLET, FILM COATED ORAL 4 TIMES DAILY PRN
Qty: 40 TABLET | Refills: 0 | Status: SHIPPED | OUTPATIENT
Start: 2022-06-13 | End: 2022-06-23

## 2022-06-13 RX ORDER — AMOXICILLIN 500 MG/1
500 CAPSULE ORAL 3 TIMES DAILY
Qty: 21 CAPSULE | Refills: 0 | Status: SHIPPED | OUTPATIENT
Start: 2022-06-13 | End: 2022-06-20

## 2022-06-13 RX ADMIN — AMLODIPINE BESYLATE 5 MG: 5 TABLET ORAL at 09:50

## 2022-06-13 RX ADMIN — LISINOPRIL 10 MG: 10 TABLET ORAL at 09:51

## 2022-06-13 RX ADMIN — Medication 1 EACH: at 12:25

## 2022-06-13 RX ADMIN — PANTOPRAZOLE SODIUM 40 MG: 40 TABLET, DELAYED RELEASE ORAL at 05:54

## 2022-06-13 RX ADMIN — METOPROLOL SUCCINATE 25 MG: 25 TABLET, EXTENDED RELEASE ORAL at 09:51

## 2022-06-13 RX ADMIN — HYDROCODONE BITARTRATE AND ACETAMINOPHEN 1 TABLET: 5; 325 TABLET ORAL at 00:17

## 2022-06-13 RX ADMIN — FUROSEMIDE 40 MG: 40 TABLET ORAL at 09:50

## 2022-06-13 RX ADMIN — INSULIN LISPRO 8 UNITS: 100 INJECTION, SOLUTION INTRAVENOUS; SUBCUTANEOUS at 13:22

## 2022-06-13 RX ADMIN — INSULIN LISPRO 1 UNITS: 100 INJECTION, SOLUTION INTRAVENOUS; SUBCUTANEOUS at 13:22

## 2022-06-13 RX ADMIN — HYDROCODONE BITARTRATE AND ACETAMINOPHEN 1 TABLET: 5; 325 TABLET ORAL at 05:54

## 2022-06-13 ASSESSMENT — PAIN DESCRIPTION - DESCRIPTORS: DESCRIPTORS: DISCOMFORT;THROBBING;TIGHTNESS

## 2022-06-13 ASSESSMENT — PAIN DESCRIPTION - LOCATION
LOCATION: LEG
LOCATION: LEG

## 2022-06-13 ASSESSMENT — PAIN SCALES - GENERAL
PAINLEVEL_OUTOF10: 6
PAINLEVEL_OUTOF10: 5

## 2022-06-13 ASSESSMENT — PAIN DESCRIPTION - ORIENTATION: ORIENTATION: RIGHT

## 2022-06-13 NOTE — DISCHARGE SUMMARY
Melvin Rosales  :  1955  MRN:  [id number]    Admit date:  6/10/2022  Discharge date:  2022    Discharging Physician:  Dr. Nick Caraballo Directive: Full Code    Consults: Judit Martinez     Primary Care Physician:  No primary care provider on file. Discharge Diagnoses:  Principal Problem:    Cellulitis of right leg  Active Problems:    Chronic respiratory failure (HCC)    Coronary artery disease involving native coronary artery of native heart without angina pectoris    DVT (deep venous thrombosis) (HCC)    Lymphedema associated with obesity    Gastro-esophageal reflux disease without esophagitis    Hyperlipidemia, unspecified    Hypertension associated with stage 3b chronic kidney disease due to type 2 diabetes mellitus (HCC)    Malignant melanoma of right shoulder (HCC)    Venous insufficiency of both lower extremities    Pulmonary embolism (HCC)    Type 2 diabetes mellitus (HCC)    Bilateral lower leg cellulitis  Resolved Problems:    * No resolved hospital problems. *      Portions of this note have been copied forward, however, changed to reflect the most current clinical status of this patient. Hospital Course: The patient is a 62 y. o. female with past medical history of CKD, lymphedema, melanoma, venous insufficiency, and PE on Coumadin therapy who presented to [facility] ED complaining of worsening right leg pain.  Patient indicated she has had lymphedema for significant amount of time.  Patient reported having home health care with dressing changes 3 days a week to bilateral lower extremities.  She reported in the past 3 months having 3-4 different rounds of antibiotics.  Patient indicated her best friend was recently diagnosed with brain cancer and she traveled from [address] [place] [address] to [place] [address] to visit. Lauryn Damon indicated while in with home health. Lovenox bridge until INR therapeutic, will need INR on Wednesday or Thursday. Significant Diagnostic Studies:   No results found. Pertinent Labs:  CBC:   Recent Labs     06/11/22 0407 06/12/22 0455 06/13/22 0357   WBC 8.0 8.2 7.2   HGB 12.6 12.2 13.3    304 339     BMP:    Recent Labs     06/11/22 0407 06/12/22 0354 06/13/22 0357    136 142   K 5.1* 5.2* 4.8    103 103   CO2 24 24 26   BUN 27* 25* 25*   CREATININE 1.4* 1.5* 1.4*   GLUCOSE 136* 178* 141*     INR:   Recent Labs     06/11/22 0407 06/12/22 0455 06/13/22 0357   INR 1.67* 1.61* 1.57*       Physical Exam:  Vital Signs: BP (!) 149/74   Pulse 70   Temp 96.8 °F (36 °C) (Temporal)   Resp 16   Ht 5' 3\" (1.6 m)   Wt (!) 362 lb 4 oz (164.3 kg)   SpO2 96%   BMI 64.17 kg/m²   Physical Exam  Vitals reviewed. HENT:      Head: Normocephalic. Cardiovascular:      Rate and Rhythm: Normal rate and regular rhythm. Pulses: Normal pulses. Heart sounds: Normal heart sounds. Pulmonary:      Effort: Pulmonary effort is normal.      Breath sounds: Normal breath sounds. Abdominal:      General: Bowel sounds are normal. There is no distension. Palpations: Abdomen is soft. Tenderness: There is no abdominal tenderness. There is no guarding. Musculoskeletal:         General: Normal range of motion. Right lower leg: Edema present. Left lower leg: Edema present. Skin:     Capillary Refill: Capillary refill takes less than 2 seconds. Findings: Erythema and rash present. Comments: Generalized papular rash scattered over entire body, significantly improved. Rash appears to be drying out and patient no longer complaining of itching. right lower extremity with erythema to approximately the knee. 3+ edema. No more weeping noted in right lower extremity. It appears dry and cracked. left lower extremity 2+ edema and slightly reddened with no drainage. Significant improvement. Neurological:      General: No focal deficit present. Mental Status: She is alert. Discharge Medications:         Medication List      START taking these medications    amoxicillin 500 mg capsule  Commonly known as: AMOXIL  Take 1 capsule by mouth 3 times daily for 7 days     doxycycline hyclate 100 MG tablet  Commonly known as: VIBRA-TABS  Take 1 tablet by mouth 2 times daily for 7 days     hydrOXYzine HCl 25 MG tablet  Commonly known as: ATARAX  Take 1 tablet by mouth 4 times daily as needed for Itching     xeroform petrolatum gauze 5\"X9\" Misc external pads  Apply 1 each topically daily  Start taking on: June 14, 2022        CHANGE how you take these medications    enoxaparin 100 MG/ML  Commonly known as: LOVENOX  Inject 1 mL into the skin 2 times daily for 14 days  What changed:   · how much to take  · how to take this  · when to take this     HYDROcodone-acetaminophen 5-325 MG per tablet  Commonly known as: NORCO  Take 1 tablet by mouth every 6 hours as needed for Pain for up to 3 days.   What changed:   · how much to take  · reasons to take this        CONTINUE taking these medications    allopurinol 300 MG tablet  Commonly known as: ZYLOPRIM     amLODIPine 5 MG tablet  Commonly known as: NORVASC     atorvastatin 40 MG tablet  Commonly known as: LIPITOR     furosemide 20 MG tablet  Commonly known as: LASIX     lisinopril 10 MG tablet  Commonly known as: PRINIVIL;ZESTRIL     metoprolol succinate 25 MG extended release tablet  Commonly known as: TOPROL XL     NovoLOG 100 UNIT/ML injection vial  Generic drug: insulin aspart     omeprazole 40 MG delayed release capsule  Commonly known as: PRILOSEC     oxybutynin 10 mg extended release tablet  Commonly known as: DITROPAN-XL     pramipexole 1 MG tablet  Commonly known as: MIRAPEX     Toujeo Max SoloStar 300 UNIT/ML Sopn  Generic drug: Insulin Glargine (2 Unit Dial)     * warfarin 7.5 MG tablet  Commonly known as: COUMADIN     * warfarin 5 MG tablet  Commonly known as: COUMADIN         * This list has 2 medication(s) that are the same as other medications prescribed for you. Read the directions carefully, and ask your doctor or other care provider to review them with you. STOP taking these medications    clindamycin 1 % lotion  Commonly known as: CLEOCIN T     imiquimod 5 % cream  Commonly known as: ALDARA           Where to Get Your Medications      These medications were sent to [address]  [address], [address] [place] [address] [place] [address]    Phone: [phone]   · amoxicillin 500 mg capsule  · doxycycline hyclate 100 MG tablet  · hydrOXYzine HCl 25 MG tablet  · xeroform petrolatum gauze 5\"X9\" Misc external pads     These medications were sent to [address]    Phone: [phone]   · enoxaparin 100 MG/ML  · HYDROcodone-acetaminophen 5-325 MG per tablet         Discharge Instructions: Follow up with PCP in 3 days. Take medications as directed. Resume activity as tolerated. Diet: ADULT DIET; Regular; 4 carb choices (60 gm/meal); Low Fat/Low Chol/High Fiber/SIDDHARTHA; Low Sodium (2 gm)     Disposition: Patient is Stableand will be discharged to Home with [address] [place] [address]. Time spent on discharge 33 minutes spent in assessing patient, reviewing medications, discussion with nursing, confirming safe discharge plan and preparation of discharge summary.     Signed:  BLAKE Tubbs CNP, 6/13/2022 3:57 PM

## 2022-06-13 NOTE — PROGRESS NOTES
Called [facility] to check to see if Lovenox would be covered, reported it would cost $14.    Electronically signed by Negin Ferreira RN on 6/13/2022 at 4:42 PM

## 2022-06-13 NOTE — CARE COORDINATION
HH referral received. Patient is agreeable and states she had HH previously. Referral called to [facility].   Referral faxed  P. [phone]  F, [phone]  Electronically signed by Luanne Hudson on 6/13/22 at 1:42 PM CDT

## 2022-06-13 NOTE — PROGRESS NOTES
Patient reported she has used lovenox injections in the past and is comfortable with self administration at discharge.     Electronically signed by Ousmane Stinson RN on 6/13/2022 at 5:15 PM

## 2022-06-13 NOTE — CONSULTS
[facility]    CONSULT    Patient:  Glenys Serrano  YOB: 1955  Date of Service: 6/13/2022  MRN: [id number]   Acct: [de-identified]   Primary Care Physician: No primary care provider on file. Reason for consult: Abnormal ESTEFANY    Requesting Physician: Ilda LOZANO    History Obtained From: Patient    HISTORY OF PRESENT ILLNESS:  Ms. Glenys Serrano is a 61 y.o. morbidly obese female who presented with leg swelling. She has known lymphedema, followed by lymphedema clinic in [facility]. Patient stated she had been to [place] to visit a friend and was traveling by vehicle on her way back. Work-up in the ER, labs were unremarkable, except for mild elevated of CR. She is diabetic, HgbA1c was 8.7. On examination, patient noted to have BLE with 4+ edema with erythema with weeping of serous fluid. She was started on empiric antibiotics and admitted to the hospitalist service. Her BLE edema has improved. BLE ESTEFANY were completed were preliminary report read as abnormal by hospitalist as right digit was 0.57. Therefore, vascular surgery was consulted for further evaluation. Past Medical History:       Diagnosis Date    Anemia     Blood circulation, collateral     Cancer (HCC)     Chronic kidney disease     GERD (gastroesophageal reflux disease)     Hyperlipidemia     Hypertension     Iron (Fe) deficiency anemia     Lymphedema     Lymphedema     Melanoma in situ of right shoulder ([facility] [address] 05/01/2012    remission    Pneumonia     Pulmonary embolism (HCC)     Restless legs syndrome     Venous insufficiency        Past Surgical History:        Procedure Laterality Date    BREAST SURGERY      COLONOSCOPY      COSMETIC SURGERY      ENDOSCOPY, COLON, DIAGNOSTIC      GASTRIC BYPASS SURGERY      HYSTERECTOMY (CERVIX STATUS UNKNOWN)      SKIN BIOPSY         Allergies:  Patient has no known allergies.     Medications Current:   Current Facility-Administered Medications   Medication Dose Route Frequency Provider Last Rate Last Admin    xeroform petrolatum gauze 5\"X9\" external pads 1 each  1 each Topical Daily [place] Bodily, APRN - CNP        warfarin (COUMADIN) tablet 12.5 mg  12.5 mg Oral Once [place] Bodily, APRN - CNP        morphine (PF) injection 1 mg  1 mg IntraVENous Q6H PRN Del Vaughn MD   1 mg at 06/12/22 1131    furosemide (LASIX) tablet 40 mg  40 mg Oral Daily [place] Bodily, APRN - CNP   40 mg at 06/13/22 0950    diphenhydrAMINE (BENADRYL) injection 25 mg  25 mg IntraVENous Q4H PRN Mitesh Marking, DO   25 mg at 06/12/22 2044    pantoprazole (PROTONIX) tablet 40 mg  40 mg Oral BID AC [place] Bodily, APRN - CNP   40 mg at 06/13/22 0554    vancomycin (VANCOCIN) 1,000 mg in sodium chloride 0.9 % 250 mL IVPB  1,000 mg IntraVENous Q24H Del Vaughn MD   Stopped at 06/12/22 1549    vancomycin (VANCOCIN) intermittent dosing (placeholder)   Other RX Placeholder Bladimir Cleary MD        amLODIPine (NORVASC) tablet 5 mg  5 mg Oral Daily [place] Bodily, APRN - CNP   5 mg at 06/13/22 0950    lisinopril (PRINIVIL;ZESTRIL) tablet 10 mg  10 mg Oral Daily [place] Bodily, APRN - CNP   10 mg at 06/13/22 0951    metoprolol succinate (TOPROL XL) extended release tablet 25 mg  25 mg Oral Daily [place] Bodily, APRN - CNP   25 mg at 06/13/22 0951    oxybutynin (DITROPAN-XL) extended release tablet 10 mg  10 mg Oral Nightly [place] Bodily, APRN - CNP   10 mg at 06/12/22 2023    pramipexole (MIRAPEX) tablet 1 mg  1 mg Oral QPM [place] Bodily, APRN - CNP   1 mg at 06/12/22 1751    glucose chewable tablet 16 g  4 tablet Oral PRN [place] Bodily, APRN - CNP        dextrose bolus 10% 125 mL  125 mL IntraVENous PRN [place] Bodily, APRN - CNP        Or    dextrose bolus 10% 250 mL  250 mL IntraVENous PRN [place] Bodily, APRN - CNP        glucagon (rDNA) injection 1 mg  1 mg IntraMUSCular PRN Aron Baumgarten Beau, BLAKE - CNP        dextrose 5 % solution  100 mL/hr IntraVENous PRN [facility], BLAKE - CNP        sodium chloride flush 0.9 % injection 5-40 mL  5-40 mL IntraVENous 2 times per day [facility], BLAKE - CNP   10 mL at 06/11/22 2126    sodium chloride flush 0.9 % injection 5-40 mL  5-40 mL IntraVENous PRN [facility], APRN - CNP   10 mL at 06/12/22 2044    0.9 % sodium chloride infusion   IntraVENous PRN [facility], BLAKE - SHAINA        ondansetron (ZOFRAN-ODT) disintegrating tablet 4 mg  4 mg Oral Q8H PRN [facility], BLAKE - CNP        Or    ondansetron (ZOFRAN) injection 4 mg  4 mg IntraVENous Q6H PRN [facility], BLAKE - SHAINA        polyethylene glycol (GLYCOLAX) packet 17 g  17 g Oral Daily PRN [facility], BLAKE - SHAINA        acetaminophen (TYLENOL) tablet 650 mg  650 mg Oral Q6H PRN [facility]        Or    acetaminophen (TYLENOL) suppository 650 mg  650 mg Rectal Q6H PRN [facility], BLAKE - CNP        insulin glargine (LANTUS) injection vial 24 Units  0.15 Units/kg SubCUTAneous Nightly [facility], APRN - CNP   24 Units at 06/12/22 2023    insulin lispro (HUMALOG) injection vial 8 Units  0.05 Units/kg SubCUTAneous TID [facility]   8 Units at 06/12/22 1751    insulin lispro (HUMALOG) injection vial 0-12 Units  0-12 Units SubCUTAneous TID [facility] BLAKE - CNP   2 Units at 06/12/22 1752    insulin lispro (HUMALOG) injection vial 0-6 Units  0-6 Units SubCUTAneous Nightly [facility], APRN - CNP   2 Units at 06/12/22 2024    warfarin placeholder: dosing by pharmacy   Other RX Placeholder [facility]        cefepime (MAXIPIME) 2000 mg IVPB minibag  2,000 mg IntraVENous Q24H [facility], APRN - CNP   Stopped at 06/12/22 2253    allopurinol (ZYLOPRIM) tablet 150 mg  150 mg Oral Nightly Brad Silvestre MD   150 mg at 06/12/22 2023    atorvastatin (LIPITOR) tablet 40 mg  40 mg Oral Nightly Moraima Grove MD   40 mg at 06/12/22 2023    heparin (porcine) injection 10,000 Units  10,000 Units IntraVENous PRN Maryella Peeling, APRN - CNP        heparin 25,000 units in dextrose 5% 250 mL (premix) infusion  5-30 Units/kg/hr IntraVENous Continuous Maryella Peeling, APRN - CNP 8.2 mL/hr at 06/12/22 1356 5 Units/kg/hr at 06/12/22 1356    heparin (porcine) injection 5,000 Units  5,000 Units IntraVENous PRN Maryella Peeling, APRN - CNP        HYDROcodone-acetaminophen (NORCO) 5-325 MG per tablet 1 tablet  1 tablet Oral Q4H PRN Maryella Peeling, APRN - CNP   1 tablet at 06/13/22 0554       Social History:  R  eports that she quit smoking about 24 years ago. Her smoking use included cigarettes. She started smoking about 34 years ago. She has a 15.00 pack-year smoking history. She has never used smokeless tobacco. She reports that she does not drink alcohol and does not use drugs. Family History:  History reviewed. No pertinent family history. REVIEW OF SYSTEMS:  General: Denies any fever or chills. Denies any unexplained weight loss or gain. Denies any change in activity or endurance. HEENT: Denies any headaches or visual changes. Respiratory: Denies any cough or hoarseness. Cardiac: Denies any chest pain or pressure. Denies any palpitations. Denies any presyncope or syncope. Denies any orthopnea or PND. Denies any lower extremity edema. GI: Denies any abdominal pain. Denies any nausea or vomiting. Denies any change in bowel habits. Denies any recent history of GI tract blood loss. : Denies any hematuria, frequency, hesitancy, or dysuria. Musculoskeletal: Increased swelling of BLE. Patient stated has been on a long road trip, traveling from [place] to [place]; now on her way back. Follows with lymphedema clinic, uses pumps twice a day at home. Neurological: Denies any paraesthesias.  Denies any history of seizure or stroke symptoms. Psychological: Denies any problems with anxiety or depression. All other systems are negative except where stated above. PHYSICAL EXAM:  BP (!) 144/62   Pulse 69   Temp 97.2 °F (36.2 °C)   Resp 16   Ht 5' 3\" (1.6 m)   Wt (!) 362 lb 4 oz (164.3 kg)   SpO2 94%   BMI 64.17 kg/m²   General appearance: Demonstrates a well-developed, well-nourished  female who is alert and oriented in no acute distress. HEENT: Normocephalic. Atraumatic. ZUNILDA. NECK: Supple. NO JVD. No carotids bruits auscultated. Chest: Clear to auscultation bilaterally without wheezes or rhonchi. Cardiac: Normal heart tones with regular rate and rhythm, S1, S2 normal. Soft systolic murmur, no gallops or rubs auscultated. Abdomen: Obese, soft, non-tender; non-distended normal bowel sounds no masses, no organomegaly. Extremities: No clubbing or cyanosis. 2+ BLE edema with erythema. Multiple cracks around BLE where old wraps were. Right foot DP and PT with biphasic doppler signals. Left foot DP with monophasic doppler signal and PT with biphasic doppler signals. Skin: Skin color, texture, turgor normal. No rashes or lesions  Neurologic: Grossly intact. LABS AND DIAGNOSTICS:    VL Arterial Segmental Pressures with PPG:                                      RT                               LT     High thigh:                   N/A                              N/A                                Low thigh:                    NC                               NC  Calf:                             NC                               NC  Ankle (PT):                  1.00                             0.97  Ankle (DP):                  1.09                             1.08                                                                            Digit:                            0.57                             0.95        This is a preliminary report.  Final report pending.      NC=non compressible  Could not get cuff on high thigh due to body habitus      CBC with Differential:   Lab Results   Component Value Date    WBC 7.2 06/13/2022    RBC 4.96 06/13/2022    HGB 13.3 06/13/2022    HCT 45.2 06/13/2022     06/13/2022    MCV 91.1 06/13/2022    LYMPHOPCT 13.9 06/13/2022     BMP:   Lab Results   Component Value Date     06/13/2022    K 4.8 06/13/2022     06/13/2022    CO2 26 06/13/2022    BUN 25 06/13/2022    CREATININE 1.4 06/13/2022    CALCIUM 9.0 06/13/2022    GFRAA 46 06/13/2022    LABGLOM 38 06/13/2022    GLUCOSE 141 06/13/2022       ASSESSMENT:    1. Cellulits 2' to Worsening Lymphedema  2. Chronic Lymphedema followed at Outpatient Lymphedema Clinic  3. DM, Type 2, Uncontrolled with HgbA1c 8.7  4. Essential HTN  5. Mixed Hyperlipidemia  6. Hx PE - on Chronic Anticoagulation with Coumadin  7. Morbid Obesity 2' to Excess Calories with BMI 64.17        PLAN:    1. No Vascular surgical intervention needed. 2. Wound Care per wound care nurse. 3. F/U with Lymphedema Clinic. Vascular surgery will sign off. Call again if needed.        Jimmy Welch, APRN

## 2022-06-13 NOTE — PROGRESS NOTES
Occupational Therapy Initial Assessment  Date: 2022   Patient Name: Hany Jose  MRN: [id number]     : 1955    Date of Service: 2022    Discharge Recommendations:  24 hour supervision or assist  OT Equipment Recommendations  Equipment Needed: No    Assessment   Assessment: OT evaluation completed. Pt does not display any deficits that would warrant further OT services in this setting. Pt does have some lingering decreased activity tolerance/deconditioned state d/t prolonged condition. Pt can increase stamina with progression of general activity according to pt's tolerance. OT does not anticipate any environmental barriers to D/C home once medically cleared if 24/7 assist is available for safety. REQUIRES OT FOLLOW-UP: No  Activity Tolerance  Activity Tolerance: Patient Tolerated treatment well              Patient Diagnosis(es): The primary encounter diagnosis was Bilateral lower leg cellulitis. Diagnoses of Chronic renal impairment, stage 3b ([address] Type 2 diabetes mellitus with hyperlipidemia ([address] History of pulmonary embolus (PE), Subtherapeutic international normalized ratio (INR), Lymphedema of both lower extremities, and Morbid obesity ([address] were also pertinent to this visit.              Subjective   General  Chart Reviewed: Yes  Patient assessed for rehabilitation services?: Yes  Family / Caregiver Present: No  Diagnosis: cellulitis of right leg  Pre Treatment Pain Screening  Pain at present: 0  Scale Used: Numeric Score  Intervention List: Patient able to continue with treatment    Social/Functional History  Social/Functional History  Lives With: Family (Brother and sister in law)  Type of Home: House  Home Layout: Able to Live on Main level with bedroom/bathroom (Tri level)  Bathroom Shower/Tub: Walk-in shower  Bathroom Toilet: Standard  Bathroom Equipment: Grab bars in shower  Bathroom Accessibility: Accessible  Home Equipment: Rollator (APAP machine & machine for legs to move fluid)  Has the patient had two or more falls in the past year or any fall with injury in the past year?: No  Receives Help From: Family  ADL Assistance: [address]: Independent  Homemaking Responsibilities: Yes  Ambulation Assistance: Independent  Transfer Assistance: Independent  Active : Yes  Mode of Transportation: [facility]  Occupation: Retired       Objective                 Toilet Transfers  Toilet - Technique: Ambulating  Equipment Used: Raised toilet seat with rails  Toilet Transfer: Stand by assistance  ADL  Feeding: Independent;Setup  Grooming: Independent  UE Bathing: Modified independent   LE Bathing: Modified independent   UE Dressing: Independent  LE Dressing: Independent  Toileting: Independent           Transfers  Sit to stand: Stand by assistance  Stand to sit: Stand by assistance     Cognition  Overall Cognitive Status: WNL               Gross Assessment  AROM: Generally decreased, functional  Strength: Generally decreased, functional  AROM: Generally decreased, functional  Strength: Generally decreased, functional                       Plan   Plan  Plan Comment: N/A; EVAL ONLY    Goals  Short Term Goals  Short Term Goal 1: N/A; EVAL ONLY  Long Term Goals  Long Term Goal 1: N/A; EVAL ONLY               Mindi Velez OTR/L  Electronically signed by Mary Lou De La Rosa OTVICTOR MANUEL/L on 6/13/2022 at 2:59 PM.

## 2022-06-13 NOTE — PROGRESS NOTES
Patient seen with vascular nurse. Per vascular ESTEFANY shows patient has relative normal arterail flow and does not need vascular intervention. Patient has cellulitis and stasis dermatitis to the right lower leg. There are some fissures around the ankles but no focal wounds. Patient has dried peeling scale and epidermis to the lower leg with bright red erythema to the entire lower leg that is improving per patient. Patient is planning to discharge and return to her home in [place] later today. Leg was washed with chlorhexidine scrub. Xeroform to fissures, Adaptic to weepy areas, covered with ABD, roll gauze, and then ace wrap from toes toward knee. Recommendations:     RIGHT LOWER LEG: Clean the leg with equal parts hibiclens and soap once per day for 1 week. Rinse well. Pat Dry. Moisturize leg with your preferred lotion/moisturizer. Cover open and weeping areas with Adaptic or vasoline gauze. Cover with ABD, roll gauze, and then wrap ace wrap from toes toward the knee. Change daily or if the dressing becomes saturated or soiled. Keep leg elevated as much as possible. Resume pumps when you get home. When redness and irritation to right lower leg is improved resume your prior compression regiment.     Electronically signed by Del Mistry RN, [address] [place] [address],[address] on 6/13/2022 at 10:50 AM

## 2022-06-13 NOTE — PROGRESS NOTES
Upon walking in patient's room, she stated she was itching all over her body. The rash she has all over her body is improving. There is decreased redness. I spoke with on call MD and he ordered a med to control her itching.

## 2022-06-13 NOTE — PROGRESS NOTES
Clinical Pharmacy Note    Warfarin consult follow-up    Recent Labs     06/13/22  0357   INR 1.57*     Recent Labs     06/11/22  0407 06/12/22  0455 06/13/22  0357   HGB 12.6 12.2 13.3   HCT 43.4 42.1 45.2    304 339     Current warfarin drug-drug interactions:      Current warfarin drug-drug interactions:  Heparin DRIP bridge     Allopurinol - Allopurinol may enhance the anticoagulant effect of Vitamin K Antagonists. Monitor for increased prothrombin times (PT)/therapeutic effects of oral anticoagulants if allopurinol is initiated/dose increased, or decreased effects if allopurinol is discontinued/dose decreased. Reductions in coumarin dosage will likely be needed. Cefepime - Cephalosporins may enhance the anticoagulant effect of Vitamin K Antagonists. Monitor for elevated INR and bleeding if a vitamin K antagonist is used in combination with cephalosporins. Cephalosporins that have an NMTT (1-MTT) side chain in their chemical structure may pose the greatest risk of interaction- Allopurinol may enhance the anticoagulant effect of Vitamin K Antagonists. Monitor for increased prothrombin times (PT)/therapeutic effects of oral anticoagulants if allopurinol is initiated/dose increased, or decreased effects if allopurinol is discontinued/dose decreased. Reductions in coumarin dosage will likely be needed. Date INR Warfarin Dose   06/10/22 1.37 7.5 mg   06/11/22 1.67  7.5 mg    06/12/22 1.61  7.5 mg     06/13/22 1.57 12.5 mg              Notes:  Give 12.5 mg x 1 tonight             Daily PT/INR until stable within therapeutic range.      Electronically signed by Alyx Salcido, [address] [place] [address] on 6/13/2022 at 10:03 AM

## 2022-06-15 LAB
BLOOD CULTURE, ROUTINE: NORMAL
BLOOD CULTURE, ROUTINE: NORMAL
CULTURE, BLOOD 2: NORMAL